# Patient Record
Sex: FEMALE | Race: WHITE | NOT HISPANIC OR LATINO | Employment: FULL TIME | URBAN - METROPOLITAN AREA
[De-identification: names, ages, dates, MRNs, and addresses within clinical notes are randomized per-mention and may not be internally consistent; named-entity substitution may affect disease eponyms.]

---

## 2017-01-05 ENCOUNTER — OFFICE VISIT (OUTPATIENT)
Dept: URGENT CARE | Facility: URGENT CARE | Age: 52
End: 2017-01-05
Payer: COMMERCIAL

## 2017-01-05 VITALS
OXYGEN SATURATION: 96 % | BODY MASS INDEX: 25.91 KG/M2 | DIASTOLIC BLOOD PRESSURE: 76 MMHG | WEIGHT: 151 LBS | HEART RATE: 74 BPM | TEMPERATURE: 99.2 F | SYSTOLIC BLOOD PRESSURE: 130 MMHG

## 2017-01-05 DIAGNOSIS — H53.8 BLURRED VISION: ICD-10-CM

## 2017-01-05 DIAGNOSIS — H57.12 LEFT EYE PAIN: Primary | ICD-10-CM

## 2017-01-05 PROCEDURE — 99213 OFFICE O/P EST LOW 20 MIN: CPT | Performed by: FAMILY MEDICINE

## 2017-01-05 RX ORDER — AZELASTINE 1 MG/ML
1 SPRAY, METERED NASAL 2 TIMES DAILY
COMMUNITY
End: 2024-08-18

## 2017-01-05 NOTE — MR AVS SNAPSHOT
After Visit Summary   1/5/2017    Anna Smith    MRN: [id number]           Patient Information     Date Of Birth          1965        Visit Information        Provider Department      1/5/2017 9:15 AM Maicol Mcnamara,  [facility] [place]        Today's Diagnoses     Left eye pain    -  1     Blurred vision            Follow-ups after your visit        Who to contact     If you have questions or need follow up information about today's clinic visit or your schedule please contact [facility] directly at [phone].  Normal or non-critical lab and imaging results will be communicated to you by GoHomehart, letter or phone within 4 business days after the clinic has received the results. If you do not hear from us within 7 days, please contact the clinic through GoHomehart or phone. If you have a critical or abnormal lab result, we will notify you by phone as soon as possible.  Submit refill requests through [employer] or call your pharmacy and they will forward the refill request to us. Please allow 3 business days for your refill to be completed.          Additional Information About Your Visit        MyChart Information     [employer] gives you secure access to your electronic health record. If you see a primary care provider, you can also send messages to your care team and make appointments. If you have questions, please call your primary care clinic.  If you do not have a primary care provider, please call [phone] and they will assist you.        Care EveryWhere ID     This is your Care EveryWhere ID. This could be used by other organizations to access your [facility] medical records  [id number]        Your Vitals Were     Pulse Temperature Pulse Oximetry             74 99.2  F (37.3  C) (Oral) 96%          Blood Pressure from Last 3 Encounters:   01/05/17 130/76   10/29/14 110/80   06/19/13 110/70    Weight from Last 3 Encounters:   01/05/17 151  lb (68.493 kg)   10/29/14 144 lb (65.318 kg)   06/19/13 131 lb (59.421 kg)              Today, you had the following     No orders found for display       Primary Care Provider    None Doctor, MD       No address on file        Thank you!     Thank you for choosing [facility]  for your care. Our goal is always to provide you with excellent care. Hearing back from our patients is one way we can continue to improve our services. Please take a few minutes to complete the written survey that you may receive in the mail after your visit with us. Thank you!             Your Updated Medication List - Protect others around you: Learn how to safely use, store and throw away your medicines at www.disposemymeds.org.          This list is accurate as of: 1/5/17  9:26 AM.  Always use your most recent med list.                   Brand Name Dispense Instructions for use    * albuterol (2.5 MG/3ML) 0.083% neb solution     1 vial    In clinic       * albuterol 108 (90 BASE) MCG/ACT Inhaler    PROAIR HFA/PROVENTIL HFA/VENTOLIN HFA    1 Inhaler    Inhale 2 puffs into the lungs every 6 hours as needed for shortness of breath / dyspnea or wheezing       azelastine 0.1 % spray    ASTELIN     Spray 1 spray into both nostrils 2 times daily       CALCIUM + D PO      Take  by mouth.       cetirizine 10 MG tablet    zyrTEC     Take 10 mg by mouth daily       fluticasone 50 MCG/ACT spray    FLONASE     Spray 2 sprays into both nostrils daily       predniSONE 20 MG tablet    DELTASONE    5 tablet    Take 1 tablet (20 mg) by mouth daily       PROGESTERONE          SUDAFED 12 HOUR 120 MG 12 hr tablet   Generic drug:  pseudoePHEDrine      Take 120 mg by mouth every 12 hours.       VITAMIN D PO          * Notice:  This list has 2 medication(s) that are the same as other medications prescribed for you. Read the directions carefully, and ask your doctor or other care provider to review them with you.

## 2017-01-05 NOTE — NURSING NOTE
"Chief Complaint   Patient presents with     Eye Problem     lt eye red and sore for 4 days,had cold sx previous to sx       Initial /76 mmHg  Pulse 74  Temp(Src) 99.2  F (37.3  C) (Oral)  Wt 151 lb (68.493 kg)  SpO2 96% Estimated body mass index is 25.91 kg/(m^2) as calculated from the following:    Height as of 2/17/06: 5' 4\" (1.626 m).    Weight as of this encounter: 151 lb (68.493 kg).  BP completed using cuff size: regular    "

## 2017-06-03 ENCOUNTER — HEALTH MAINTENANCE LETTER (OUTPATIENT)
Age: 52
End: 2017-06-03

## 2018-10-27 ENCOUNTER — OFFICE VISIT (OUTPATIENT)
Dept: URGENT CARE | Facility: URGENT CARE | Age: 53
End: 2018-10-27
Payer: COMMERCIAL

## 2018-10-27 VITALS
SYSTOLIC BLOOD PRESSURE: 128 MMHG | OXYGEN SATURATION: 94 % | TEMPERATURE: 99.6 F | HEART RATE: 82 BPM | DIASTOLIC BLOOD PRESSURE: 88 MMHG | RESPIRATION RATE: 16 BRPM

## 2018-10-27 DIAGNOSIS — R07.0 THROAT PAIN: ICD-10-CM

## 2018-10-27 DIAGNOSIS — R06.02 SOB (SHORTNESS OF BREATH): Primary | ICD-10-CM

## 2018-10-27 LAB
DEPRECATED S PYO AG THROAT QL EIA: NORMAL
SPECIMEN SOURCE: NORMAL

## 2018-10-27 PROCEDURE — 99214 OFFICE O/P EST MOD 30 MIN: CPT | Mod: 25 | Performed by: FAMILY MEDICINE

## 2018-10-27 PROCEDURE — 87880 STREP A ASSAY W/OPTIC: CPT | Performed by: FAMILY MEDICINE

## 2018-10-27 PROCEDURE — 94640 AIRWAY INHALATION TREATMENT: CPT | Performed by: FAMILY MEDICINE

## 2018-10-27 PROCEDURE — 87081 CULTURE SCREEN ONLY: CPT | Performed by: FAMILY MEDICINE

## 2018-10-27 RX ORDER — ALBUTEROL SULFATE 0.83 MG/ML
SOLUTION RESPIRATORY (INHALATION)
Qty: 1 VIAL | Refills: 0
Start: 2018-10-27 | End: 2018-10-28

## 2018-10-27 RX ORDER — ALBUTEROL SULFATE 90 UG/1
2 AEROSOL, METERED RESPIRATORY (INHALATION) EVERY 6 HOURS PRN
Qty: 1 INHALER | Refills: 0 | Status: SHIPPED | OUTPATIENT
Start: 2018-10-27 | End: 2018-11-26

## 2018-10-27 NOTE — MR AVS SNAPSHOT
After Visit Summary   10/27/2018    Anna Smith    MRN: [id number]           Patient Information     Date Of Birth          1965        Visit Information        Provider Department      10/27/2018 12:40 PM Maicol Mcnamara, DO [facility]        Today's Diagnoses     Throat pain    -  1    SOB (shortness of breath)           Follow-ups after your visit        Who to contact     If you have questions or need follow up information about today's clinic visit or your schedule please contact [facility] directly at [phone].  Normal or non-critical lab and imaging results will be communicated to you by [employer], letter or phone within 4 business days after the clinic has received the results. If you do not hear from us within 7 days, please contact the clinic through Tipprt or phone. If you have a critical or abnormal lab result, we will notify you by phone as soon as possible.  Submit refill requests through [employer] or call your pharmacy and they will forward the refill request to us. Please allow 3 business days for your refill to be completed.          Additional Information About Your Visit        MyChart Information     [employer] gives you secure access to your electronic health record. If you see a primary care provider, you can also send messages to your care team and make appointments. If you have questions, please call your primary care clinic.  If you do not have a primary care provider, please call [phone] and they will assist you.        Care EveryWhere ID     This is your Care EveryWhere ID. This could be used by other organizations to access your [place] medical records  [id number]        Your Vitals Were     Pulse Temperature Respirations Pulse Oximetry          82 99.6  F (37.6  C) (Oral) 16 94%         Blood Pressure from Last 3 Encounters:   10/27/18 128/88   01/05/17 130/76   10/29/14 110/80    Weight from Last 3  Encounters:   01/05/17 151 lb (68.5 kg)   10/29/14 144 lb (65.3 kg)   06/19/13 131 lb (59.4 kg)              We Performed the Following     Beta strep group A culture     INHALATION/NEBULIZER TREATMENT, INITIAL     Strep, Rapid Screen          Today's Medication Changes          These changes are accurate as of 10/27/18  1:56 PM.  If you have any questions, ask your nurse or doctor.               These medicines have changed or have updated prescriptions.        Dose/Directions    * albuterol (2.5 MG/3ML) 0.083% neb solution   This may have changed:  Another medication with the same name was added. Make sure you understand how and when to take each.   Used for:  Wheezing   Changed by:  Maicol Mcnamara DO        In clinic   Quantity:  1 vial   Refills:  0       * albuterol 108 (90 Base) MCG/ACT inhaler   Commonly known as:  PROAIR HFA/PROVENTIL HFA/VENTOLIN HFA   This may have changed:  Another medication with the same name was added. Make sure you understand how and when to take each.   Used for:  Wheezing   Changed by:  Maicol Mcnamara DO        Dose:  2 puff   Inhale 2 puffs into the lungs every 6 hours as needed for shortness of breath / dyspnea or wheezing   Quantity:  1 Inhaler   Refills:  0       * albuterol (2.5 MG/3ML) 0.083% neb solution   This may have changed:  You were already taking a medication with the same name, and this prescription was added. Make sure you understand how and when to take each.   Used for:  SOB (shortness of breath)   Changed by:  Maicol Mcnamara DO        1 neb in clinic   Quantity:  1 vial   Refills:  0       * albuterol 108 (90 Base) MCG/ACT inhaler   Commonly known as:  PROAIR HFA   This may have changed:  You were already taking a medication with the same name, and this prescription was added. Make sure you understand how and when to take each.   Used for:  SOB (shortness of breath)   Changed by:  Maicol Mcnamara DO        Dose:  2 puff   Inhale 2 puffs into the lungs  every 6 hours as needed   Quantity:  1 Inhaler   Refills:  0       * Notice:  This list has 4 medication(s) that are the same as other medications prescribed for you. Read the directions carefully, and ask your doctor or other care provider to review them with you.         Where to get your medicines      These medications were sent to [facility] [place], [place] - [address]  [address] [facility] [address]     Phone:  [phone]     albuterol 108 (90 Base) MCG/ACT inhaler         Some of these will need a paper prescription and others can be bought over the counter.  Ask your nurse if you have questions.     You don't need a prescription for these medications     albuterol (2.5 MG/3ML) 0.083% neb solution                Primary Care Provider Office Phone # Fax #    Joseline NUÑEZ Jennieaddison [phone]       [facility] [address]  [facility] [address]        Equal Access to Services     HEMANTH DURANT AH: Hadii aad ku hadasho Soomaali, waaxda luqadaha, qaybta kaalmada adeegyada, waxay idiin haymillern shravan shah . So [facility] [phone].    ATENCIÓN: Si habla español, tiene a owens disposición servicios gratuitos de asistencia lingüística. [facility] [phone].    We comply with applicable federal civil rights laws and [place] laws. We do not discriminate on the basis of race, color, national origin, age, disability, sex, sexual orientation, or gender identity.            Thank you!     Thank you for choosing [facility]  for your care. Our goal is always to provide you with excellent care. Hearing back from our patients is one way we can continue to improve our services. Please take a few minutes to complete the written survey that you may receive in the mail after your visit with us. Thank you!             Your Updated Medication List - Protect others around you: Learn how to safely use, store and throw away your medicines at  www.disposemymeds.org.          This list is accurate as of 10/27/18  1:56 PM.  Always use your most recent med list.                   Brand Name Dispense Instructions for use Diagnosis    * albuterol (2.5 MG/3ML) 0.083% neb solution     1 vial    In clinic    Wheezing       * albuterol 108 (90 Base) MCG/ACT inhaler    PROAIR HFA/PROVENTIL HFA/VENTOLIN HFA    1 Inhaler    Inhale 2 puffs into the lungs every 6 hours as needed for shortness of breath / dyspnea or wheezing    Wheezing       * albuterol (2.5 MG/3ML) 0.083% neb solution     1 vial    1 neb in clinic    SOB (shortness of breath)       * albuterol 108 (90 Base) MCG/ACT inhaler    PROAIR HFA    1 Inhaler    Inhale 2 puffs into the lungs every 6 hours as needed    SOB (shortness of breath)       azelastine 0.1 % nasal spray    ASTELIN     Spray 1 spray into both nostrils 2 times daily        CALCIUM + D PO      Take  by mouth.        cetirizine 10 MG tablet    zyrTEC     Take 10 mg by mouth daily        fluticasone 50 MCG/ACT spray    FLONASE     Spray 2 sprays into both nostrils daily        predniSONE 20 MG tablet    DELTASONE    5 tablet    Take 1 tablet (20 mg) by mouth daily    Wheezing       PROGESTERONE           SUDAFED 12 HOUR 120 MG 12 hr tablet   Generic drug:  pseudoePHEDrine      Take 120 mg by mouth every 12 hours.        VITAMIN D PO           * Notice:  This list has 4 medication(s) that are the same as other medications prescribed for you. Read the directions carefully, and ask your doctor or other care provider to review them with you.

## 2018-10-28 LAB
BACTERIA SPEC CULT: NORMAL
SPECIMEN SOURCE: NORMAL

## 2019-09-29 ENCOUNTER — HEALTH MAINTENANCE LETTER (OUTPATIENT)
Age: 54
End: 2019-09-29

## 2021-01-14 ENCOUNTER — HEALTH MAINTENANCE LETTER (OUTPATIENT)
Age: 56
End: 2021-01-14

## 2021-10-23 ENCOUNTER — HEALTH MAINTENANCE LETTER (OUTPATIENT)
Age: 56
End: 2021-10-23

## 2022-02-12 ENCOUNTER — HEALTH MAINTENANCE LETTER (OUTPATIENT)
Age: 57
End: 2022-02-12

## 2022-10-10 ENCOUNTER — HEALTH MAINTENANCE LETTER (OUTPATIENT)
Age: 57
End: 2022-10-10

## 2022-11-12 ENCOUNTER — E-VISIT (OUTPATIENT)
Dept: URGENT CARE | Facility: CLINIC | Age: 57
End: 2022-11-12
Payer: COMMERCIAL

## 2022-11-12 DIAGNOSIS — J02.9 SORE THROAT: ICD-10-CM

## 2022-11-12 DIAGNOSIS — Z20.822 SUSPECTED COVID-19 VIRUS INFECTION: ICD-10-CM

## 2022-11-12 PROCEDURE — 99421 OL DIG E/M SVC 5-10 MIN: CPT | Mod: CS | Performed by: INTERNAL MEDICINE

## 2022-11-12 NOTE — PATIENT INSTRUCTIONS
Anna,    Your symptoms show that you may have influenza, strep or coronavirus (COVID-19).  You could also have another virus.    Because of the information you provided about your symptoms, I would like to also test you for strep, covid and influenza.  We do not test for RSV in adults.    What should I do?  I have placed orders for these tests.   To schedule: go to your [employer] home page and scroll down to the section that says  You have an appointment that needs to be scheduled  and click the large green button that says  Schedule Now  and follow the steps to find the next available openings.     If you are unable to complete these [employer] scheduling steps, please call [phone] to schedule your testing.     These guidelines are for isolating before returning to work, school or .     For employers, schools and day cares: This is an official notice for this person s medical guidelines for returning in-person.     For health care sites: The [facility] gives different isolation and quarantine guidelines for healthcare sites, please check with these sites before arriving.     How do I self-isolate?  You isolate when you have symptoms of COVID or a test shows you have COVID, even if you don t have symptoms.     If you DO have symptoms:  o Stay home and away from others  - For at least 5 days after your symptoms started, AND   - You are fever free for 24 hours (with no medicine that reduces fever), AND  - Your other symptoms are better.  o Wear a mask for 10 full days any time you are around others.    If you DON T have symptoms:  o Stay at home and away from others for at least 5 days after your positive test.  o Wear a mask for 10 full days any time you are around others.    How can I take care of myself?  Over the counter medications may help with your symptoms such as runny or stuffy nose, cough, chills, or fever. Talk to your care team about your options.     Some people are at high risk of severe illness (for  example, you have a weak immune system, you re 65 years or older, or you have certain medical problems). If your risk is high and your symptoms started in the last 5 to 7 days, we strongly recommend for you to get COVID treatment as soon as possible. Paxlovid, Molnupiravir and the monoclonal antibody treatments are proven safe and effective, make you feel better faster, and prevent hospitalization and death.       To schedule an appointment to discuss COVID treatment, request an appointment on [employer] (select  COVID-19 Treatment ) or call [employer] ([phone]), press 7.      Get lots of rest. Drink extra fluids (unless a doctor has told you not to)    Take Tylenol (acetaminophen) or ibuprofen for fever or pain. If you have liver or kidney problems, ask your family doctor if it's okay to take Tylenol or ibuprofen    Take over the counter medications for your symptoms, as directed by your doctor. You may also talk to your pharmacist.      If you have other health problems (like cancer, heart failure, an organ transplant or severe kidney disease): Call your specialty clinic if you don't feel better in the next 2 days.    Know when to call 911. Emergency warning signs include:  o Trouble breathing or shortness of breath  o Pain or pressure in the chest that doesn't go away  o Feeling confused like you haven't felt before, or not being able to wake up  o Bluish-colored lips or face    Where can I get more information?     [employer] [place] - About COVID-19: [email or web address]     CDC - What to Do If You're Sick: https://www.cdc.gov/coronavirus/2019-ncov/if-you-are-sick/index.html     CDC - Quarantine & Isolation: https://www.cdc.gov/coronavirus/2019-ncov/your-health/quarantine-isolation.html     [facility] clinical trials (COVID-19 research studies): clinicalaffairs.[facility]/n-clinical-trials    Below are the COVID-19 hotlines at the [place] Department of Health ([facility]). Interpreters  are available.  o For health questions: Call 378-604-9135 or 1-435.671.3031 (7 a.m. to 7 p.m.)  o For questions about schools and childcare: Call 658-655-5849 or 1-651.611.4567 (7 a.m. to 7 p.m.)  November 12, 2022  RE:  Anna Smith                                                                                                                  [address]  [facility] [address]      To whom it may concern:    I evaluated Anna Smith on November 12, 2022. Anna Smith should be excused from work/school.     They should let their workplace manager and staffing office know when their quarantine ends.    We can not give an exact date as it depends on the information below. They can calculate this on their own or work with their manager/staffing office to calculate this. (For example if they were exposed on 10/04, they would have to quarantine for 14 full days. That would be through 10/18. They could return on 10/19.)    Quarantine Guidelines:    If patient receives a positive COVID-19 test result, they should follow the guidance of those who are giving the results. Usually the return to work is 10 (or in some cases 20 days from symptom onset.) If they work at [employer], they must be cleared by Employee Occupational Health and Safety to return to work.      If patient receives a negative COVID-19 test result and did not have a high risk exposure to someone with a known positive COVID-19 test, they can return to work once they're free of fever for 24 hours without fever-reducing medication and their symptoms are improving or resolved.    If patient receives a negative COVID-19 test and if they had a high risk exposure to someone who has tested positive for COVID-19 then they can return to work 14 days after their last contact with the positive individual    Note: If there is ongoing exposure to the covid positive person, this quarantine period may be longer than 14 days. (For example, if they are  continually exposed to their child, who tested positive and cannot isolate from them, then the quarantine of 7-14 days can't start until their child is no longer contagious. This is typically 10 days from onset to the child's symptoms. So the total duration may be 17-24 days in this case.)     Sincerely,  Demi Pelletier MD          o

## 2022-11-14 ENCOUNTER — LAB (OUTPATIENT)
Dept: URGENT CARE | Facility: URGENT CARE | Age: 57
End: 2022-11-14
Attending: INTERNAL MEDICINE
Payer: COMMERCIAL

## 2022-11-14 ENCOUNTER — TELEPHONE (OUTPATIENT)
Dept: FAMILY MEDICINE | Facility: CLINIC | Age: 57
End: 2022-11-14

## 2022-11-14 DIAGNOSIS — Z20.822 SUSPECTED COVID-19 VIRUS INFECTION: ICD-10-CM

## 2022-11-14 DIAGNOSIS — J02.9 SORE THROAT: ICD-10-CM

## 2022-11-14 LAB
DEPRECATED S PYO AG THROAT QL EIA: NEGATIVE
FLUAV AG SPEC QL IA: POSITIVE
FLUBV AG SPEC QL IA: NEGATIVE
GROUP A STREP BY PCR: NOT DETECTED
SARS-COV-2 RNA RESP QL NAA+PROBE: NEGATIVE

## 2022-11-14 PROCEDURE — 87651 STREP A DNA AMP PROBE: CPT

## 2022-11-14 PROCEDURE — U0005 INFEC AGEN DETEC AMPLI PROBE: HCPCS

## 2022-11-14 PROCEDURE — 87804 INFLUENZA ASSAY W/OPTIC: CPT

## 2022-11-14 PROCEDURE — U0003 INFECTIOUS AGENT DETECTION BY NUCLEIC ACID (DNA OR RNA); SEVERE ACUTE RESPIRATORY SYNDROME CORONAVIRUS 2 (SARS-COV-2) (CORONAVIRUS DISEASE [COVID-19]), AMPLIFIED PROBE TECHNIQUE, MAKING USE OF HIGH THROUGHPUT TECHNOLOGIES AS DESCRIBED BY CMS-2020-01-R: HCPCS

## 2022-11-14 NOTE — TELEPHONE ENCOUNTER
Connected with Pt and informed that she will need to obtain gap period Rx from old provider's office. Pt verbalized understanding.

## 2022-11-14 NOTE — TELEPHONE ENCOUNTER
Reason for call:  Other   Patient called regarding (reason for call): appointment    Additional comments:   Appt request to establish and medication check in.  She has an Annual scheduled in Feb. She has roughly 2 mos left of her medications.  Please call to discuss. thank you    Phone number to reach patient:  Cell number on file:    Telephone Information:   Mobile [phone]       Best Time:  any    Can we leave a detailed message on this number?  YES    Travel screening: Not Applicable

## 2023-02-13 ENCOUNTER — OFFICE VISIT (OUTPATIENT)
Dept: FAMILY MEDICINE | Facility: CLINIC | Age: 58
End: 2023-02-13
Payer: COMMERCIAL

## 2023-02-13 VITALS
BODY MASS INDEX: 23.95 KG/M2 | TEMPERATURE: 98.5 F | RESPIRATION RATE: 16 BRPM | WEIGHT: 140.3 LBS | HEART RATE: 70 BPM | DIASTOLIC BLOOD PRESSURE: 77 MMHG | SYSTOLIC BLOOD PRESSURE: 115 MMHG | HEIGHT: 64 IN | OXYGEN SATURATION: 97 %

## 2023-02-13 DIAGNOSIS — E78.5 HYPERLIPIDEMIA LDL GOAL <100: ICD-10-CM

## 2023-02-13 DIAGNOSIS — I10 PRIMARY HYPERTENSION: Primary | ICD-10-CM

## 2023-02-13 DIAGNOSIS — G47.33 OBSTRUCTIVE SLEEP APNEA SYNDROME: ICD-10-CM

## 2023-02-13 DIAGNOSIS — J31.0 CHRONIC RHINITIS: ICD-10-CM

## 2023-02-13 LAB
CHOLEST SERPL-MCNC: 180 MG/DL
HDLC SERPL-MCNC: 40 MG/DL
LDLC SERPL CALC-MCNC: 121 MG/DL
NONHDLC SERPL-MCNC: 140 MG/DL
TRIGL SERPL-MCNC: 94 MG/DL

## 2023-02-13 PROCEDURE — 36415 COLL VENOUS BLD VENIPUNCTURE: CPT | Performed by: INTERNAL MEDICINE

## 2023-02-13 PROCEDURE — 99204 OFFICE O/P NEW MOD 45 MIN: CPT | Performed by: INTERNAL MEDICINE

## 2023-02-13 PROCEDURE — 80061 LIPID PANEL: CPT | Performed by: INTERNAL MEDICINE

## 2023-02-13 RX ORDER — CHLORTHALIDONE 25 MG/1
0.5 TABLET ORAL DAILY
COMMUNITY
Start: 2023-01-11 | End: 2023-06-14

## 2023-02-13 RX ORDER — PROGESTERONE 100 MG/1
CAPSULE ORAL
COMMUNITY
Start: 2022-12-16

## 2023-02-13 ASSESSMENT — PAIN SCALES - GENERAL: PAINLEVEL: NO PAIN (0)

## 2023-02-13 NOTE — PROGRESS NOTES
"  Assessment & Plan     Primary hypertension  Stable.  Continue medication    Chronic rhinitis  Stable.  Continue over-the-counter medications    Obstructive sleep apnea syndrome  Stable.    Hyperlipidemia LDL goal <100  - Lipid Profile     See Patient Instructions    Return in about 6 months (around 8/13/2023) for Follow up, Routine preventive, with me.    JACKY BOYD MD  [facility] YANIRA Castillo is a 57 year old, presenting for the following health issues:  Establish Care    Anna is a very pleasant 57-year-old lady who presented to the clinic to establish care.  She has primary hypertension and takes chlorthalidone 25 mg daily.  She has chronic rhinitis and uses over-the-counter Zyrtec and Flonase as needed.  She has sleep apnea and has a mandibular device.  She is postmenopausal and takes progesterone 100 mg 1 to 3 capsules daily.  Family history of DVT in father and hypertension in mother.  She is a .  Colonoscopy from [facility] - every 5 years  Mammogram - up to date  Pap- 2019 now due in 2024     History of Present Illness       Reason for visit:  Annual physical    She eats 2-3 servings of fruits and vegetables daily.She consumes 0 sweetened beverage(s) daily.She exercises with enough effort to increase her heart rate 20 to 29 minutes per day.  She exercises with enough effort to increase her heart rate 3 or less days per week.   She is taking medications regularly.       Review of Systems       Objective    /77 (BP Location: Right arm, Patient Position: Sitting, Cuff Size: Adult Regular)   Pulse 70   Temp 98.5  F (36.9  C)   Resp 16   Ht 1.626 m (5' 4\")   Wt 63.6 kg (140 lb 4.8 oz)   SpO2 97%   BMI 24.08 kg/m    Body mass index is 24.08 kg/m .  Physical Exam               "

## 2023-03-25 ENCOUNTER — HEALTH MAINTENANCE LETTER (OUTPATIENT)
Age: 58
End: 2023-03-25

## 2023-06-13 ENCOUNTER — MYC MEDICAL ADVICE (OUTPATIENT)
Dept: FAMILY MEDICINE | Facility: CLINIC | Age: 58
End: 2023-06-13
Payer: COMMERCIAL

## 2023-06-13 DIAGNOSIS — H10.9 CONJUNCTIVITIS, UNSPECIFIED CONJUNCTIVITIS TYPE, UNSPECIFIED LATERALITY: Primary | ICD-10-CM

## 2023-06-13 RX ORDER — POLYMYXIN B SULFATE AND TRIMETHOPRIM 1; 10000 MG/ML; [USP'U]/ML
1-2 SOLUTION OPHTHALMIC EVERY 4 HOURS
Qty: 5 ML | Refills: 0 | Status: SHIPPED | OUTPATIENT
Start: 2023-06-13 | End: 2023-06-20

## 2023-06-13 NOTE — TELEPHONE ENCOUNTER
See MyChart from patient needing provider review.   Please write back directly to pt or advise if clinic follow up needed.     Dayana WELDON, RN  [facility] RN Triage Team

## 2023-06-14 ENCOUNTER — OFFICE VISIT (OUTPATIENT)
Dept: FAMILY MEDICINE | Facility: CLINIC | Age: 58
End: 2023-06-14
Payer: COMMERCIAL

## 2023-06-14 VITALS
TEMPERATURE: 98.5 F | SYSTOLIC BLOOD PRESSURE: 109 MMHG | BODY MASS INDEX: 23.39 KG/M2 | OXYGEN SATURATION: 99 % | HEART RATE: 66 BPM | WEIGHT: 137 LBS | RESPIRATION RATE: 18 BRPM | DIASTOLIC BLOOD PRESSURE: 74 MMHG | HEIGHT: 64 IN

## 2023-06-14 DIAGNOSIS — I49.9 CARDIAC ARRHYTHMIA, UNSPECIFIED CARDIAC ARRHYTHMIA TYPE: ICD-10-CM

## 2023-06-14 DIAGNOSIS — Z00.00 ANNUAL PHYSICAL EXAM: Primary | ICD-10-CM

## 2023-06-14 DIAGNOSIS — Z11.4 SCREENING FOR HIV (HUMAN IMMUNODEFICIENCY VIRUS): ICD-10-CM

## 2023-06-14 DIAGNOSIS — I10 PRIMARY HYPERTENSION: ICD-10-CM

## 2023-06-14 DIAGNOSIS — H10.9 CONJUNCTIVITIS OF BOTH EYES, UNSPECIFIED CONJUNCTIVITIS TYPE: ICD-10-CM

## 2023-06-14 DIAGNOSIS — R06.2 WHEEZING: ICD-10-CM

## 2023-06-14 LAB
ANION GAP SERPL CALCULATED.3IONS-SCNC: 16 MMOL/L (ref 7–15)
BUN SERPL-MCNC: 10.4 MG/DL (ref 8–23)
CALCIUM SERPL-MCNC: 9.6 MG/DL (ref 8.8–10.2)
CHLORIDE SERPL-SCNC: 100 MMOL/L (ref 98–107)
CREAT SERPL-MCNC: 0.7 MG/DL (ref 0.51–0.95)
DEPRECATED HCO3 PLAS-SCNC: 26 MMOL/L (ref 22–29)
ERYTHROCYTE [DISTWIDTH] IN BLOOD BY AUTOMATED COUNT: 12.3 % (ref 10–15)
GFR SERPL CREATININE-BSD FRML MDRD: >90 ML/MIN/1.73M2
GLUCOSE SERPL-MCNC: 94 MG/DL (ref 70–99)
HCT VFR BLD AUTO: 39 % (ref 35–47)
HGB BLD-MCNC: 13.4 G/DL (ref 11.7–15.7)
MCH RBC QN AUTO: 31.4 PG (ref 26.5–33)
MCHC RBC AUTO-ENTMCNC: 34.4 G/DL (ref 31.5–36.5)
MCV RBC AUTO: 91 FL (ref 78–100)
PLATELET # BLD AUTO: 199 10E3/UL (ref 150–450)
POTASSIUM SERPL-SCNC: 3.6 MMOL/L (ref 3.4–5.3)
RBC # BLD AUTO: 4.27 10E6/UL (ref 3.8–5.2)
SODIUM SERPL-SCNC: 142 MMOL/L (ref 136–145)
TSH SERPL DL<=0.005 MIU/L-ACNC: 0.53 UIU/ML (ref 0.3–4.2)
WBC # BLD AUTO: 10 10E3/UL (ref 4–11)

## 2023-06-14 PROCEDURE — 80048 BASIC METABOLIC PNL TOTAL CA: CPT | Performed by: INTERNAL MEDICINE

## 2023-06-14 PROCEDURE — 84443 ASSAY THYROID STIM HORMONE: CPT | Performed by: INTERNAL MEDICINE

## 2023-06-14 PROCEDURE — 99214 OFFICE O/P EST MOD 30 MIN: CPT | Mod: 25 | Performed by: INTERNAL MEDICINE

## 2023-06-14 PROCEDURE — 93000 ELECTROCARDIOGRAM COMPLETE: CPT | Performed by: INTERNAL MEDICINE

## 2023-06-14 PROCEDURE — 85027 COMPLETE CBC AUTOMATED: CPT | Performed by: INTERNAL MEDICINE

## 2023-06-14 PROCEDURE — 36415 COLL VENOUS BLD VENIPUNCTURE: CPT | Performed by: INTERNAL MEDICINE

## 2023-06-14 PROCEDURE — 99396 PREV VISIT EST AGE 40-64: CPT | Performed by: INTERNAL MEDICINE

## 2023-06-14 RX ORDER — ALBUTEROL SULFATE 90 UG/1
2 AEROSOL, METERED RESPIRATORY (INHALATION) EVERY 6 HOURS PRN
Qty: 1 G | Refills: 3 | Status: SHIPPED | OUTPATIENT
Start: 2023-06-14 | End: 2024-06-25

## 2023-06-14 RX ORDER — CHLORTHALIDONE 25 MG/1
12.5 TABLET ORAL DAILY
Qty: 90 TABLET | Refills: 1 | Status: SHIPPED | OUTPATIENT
Start: 2023-06-14 | End: 2024-06-25

## 2023-06-14 ASSESSMENT — ENCOUNTER SYMPTOMS
CONSTIPATION: 0
PALPITATIONS: 0
PARESTHESIAS: 0
DYSURIA: 0
COUGH: 0
SHORTNESS OF BREATH: 0
FEVER: 0
ARTHRALGIAS: 0
BREAST MASS: 0
EYE PAIN: 0
HEMATOCHEZIA: 0
SORE THROAT: 0
HEADACHES: 0
FREQUENCY: 0
DIZZINESS: 0
DIARRHEA: 0
NAUSEA: 0
JOINT SWELLING: 0
ABDOMINAL PAIN: 0
NERVOUS/ANXIOUS: 0
HEMATURIA: 0
WEAKNESS: 0
MYALGIAS: 0
HEARTBURN: 0
CHILLS: 0

## 2023-06-14 ASSESSMENT — PAIN SCALES - GENERAL: PAINLEVEL: NO PAIN (0)

## 2023-06-14 NOTE — PROGRESS NOTES
SUBJECTIVE:   CC: Anna is an 58 year old who presents for preventive health visit.   Healthy Habits:    Getting at least 3 servings of Calcium per day:  Yes    Bi-annual eye exam:  NO    Dental care twice a year:  Yes    Sleep apnea or symptoms of sleep apnea:  Sleep apnea    Diet:  Regular (no restrictions)    Frequency of exercise:  2-3 days/week    Duration of exercise:  15-30 minutes    Taking medications regularly:  Yes    Medication side effects:  None    PHQ-2 Total Score:    Additional concerns today:  No    Anna is a very pleasant 58 year old lady who presented to the clinic for APE  She has no concerns today.  On chest auscultation, abnormal heart rhythm. EKG in clinic shows PVC. She does not have any symptoms.  Coronary calcium in 2019 was 0.      Have you ever done Advance Care Planning? (For example, a Health Directive, POLST, or a discussion with a medical provider or your loved ones about your wishes): No, advance care planning information given to patient to review.  Patient plans to discuss their wishes with loved ones or provider.      Social History     Tobacco Use     Smoking status: Never     Smokeless tobacco: Never   Vaping Use     Vaping status: Never Used   Substance Use Topics     Alcohol use: Yes     Comment: 2 drinks per week           2023    10:06 AM   Alcohol Use   Prescreen: >3 drinks/day or >7 drinks/week? No     Reviewed orders with patient.  Reviewed health maintenance and updated orders accordingly - Yes  Lab work is in process    Breast Cancer Screenin/14/2023    10:08 AM   Breast CA Risk Assessment (FHS-7)   Do you have a family history of breast, colon, or ovarian cancer? No / Unknown     Mammogram Screening: Recommended mammography every 1-2 years with patient discussion and risk factor consideration  Pertinent mammograms are reviewed under the imaging tab.    History of abnormal Pap smear: NO - age 30-65 PAP every 5 years with negative HPV co-testing  "recommended     Reviewed and updated as needed this visit by clinical staff   Tobacco  Allergies             Review of Systems   Constitutional: Negative for chills and fever.   HENT: Positive for congestion. Negative for ear pain, hearing loss and sore throat.    Eyes: Negative for pain and visual disturbance.   Respiratory: Negative for cough and shortness of breath.    Cardiovascular: Negative for chest pain, palpitations and peripheral edema.   Gastrointestinal: Negative for abdominal pain, constipation, diarrhea, heartburn, hematochezia and nausea.   Breasts:  Negative for tenderness, breast mass and discharge.   Genitourinary: Negative for dysuria, frequency, genital sores, hematuria, pelvic pain, urgency, vaginal bleeding and vaginal discharge.   Musculoskeletal: Negative for arthralgias, joint swelling and myalgias.   Skin: Negative for rash.   Neurological: Negative for dizziness, weakness, headaches and paresthesias.   Psychiatric/Behavioral: Negative for mood changes. The patient is not nervous/anxious.         OBJECTIVE:   /74 (BP Location: Left arm, Patient Position: Sitting, Cuff Size: Adult Regular)   Pulse 66   Temp 98.5  F (36.9  C) (Oral)   Resp 18   Ht 1.626 m (5' 4\")   Wt 62.1 kg (137 lb)   SpO2 99%   BMI 23.52 kg/m    Physical Exam  Vitals reviewed.   Constitutional:       Appearance: Normal appearance.   HENT:      Right Ear: Tympanic membrane normal. There is no impacted cerumen.      Left Ear: Tympanic membrane normal. There is no impacted cerumen.      Mouth/Throat:      Mouth: Mucous membranes are moist.      Pharynx: Oropharynx is clear. No oropharyngeal exudate or posterior oropharyngeal erythema.   Cardiovascular:      Rate and Rhythm: Normal rate and regular rhythm.      Heart sounds: Normal heart sounds. No murmur heard.     No gallop.   Pulmonary:      Effort: Pulmonary effort is normal. No respiratory distress.      Breath sounds: Normal breath sounds. No stridor. No " wheezing, rhonchi or rales.   Chest:   Breasts:     Right: No swelling, bleeding, inverted nipple, mass, nipple discharge, skin change or tenderness.      Left: No swelling, bleeding, inverted nipple, mass, nipple discharge, skin change or tenderness.   Abdominal:      General: Abdomen is flat. There is no distension.      Palpations: Abdomen is soft. There is no mass.      Tenderness: There is no abdominal tenderness. There is no guarding.      Hernia: No hernia is present.   Musculoskeletal:         General: Normal range of motion.      Cervical back: Normal range of motion and neck supple. No rigidity or tenderness.      Right lower leg: No edema.      Left lower leg: No edema.   Lymphadenopathy:      Cervical: No cervical adenopathy.   Skin:     General: Skin is warm and dry.   Neurological:      General: No focal deficit present.      Mental Status: She is alert.   Psychiatric:         Mood and Affect: Mood normal.       ASSESSMENT/PLAN:   Anna was seen today for physical.    Diagnoses and all orders for this visit:    Annual physical exam  -     CBC with Platelets (Today); Future  -     Basic metabolic panel; Future  -     TSH with free T4 reflex; Future    Screening for HIV (human immunodeficiency virus)  Declined.    Conjunctivitis of both eyes, unspecified conjunctivitis type  Stable. Improving.    Wheezing  Use as needed. Not formally diagnosed with asthma  -     albuterol (PROAIR HFA/PROVENTIL HFA/VENTOLIN HFA) 108 (90 Base) MCG/ACT inhaler; Inhale 2 puffs into the lungs every 6 hours as needed for shortness of breath or wheezing    Primary hypertension  Stable. Continue meds  -     chlorthalidone (HYGROTON) 25 MG tablet; Take 0.5 tablets (12.5 mg) by mouth daily    Cardiac arrhythmia, unspecified cardiac arrhythmia type  PVC's.   Discussed causes.  If she develops symptoms, will do heart monitor.  -     EKG 12-lead complete w/read - Clinics        Patient has been advised of split billing requirements  and indicates understanding: Yes      COUNSELING:  Reviewed preventive health counseling, as reflected in patient instructions       HIV screeninx in teen years, 1x in adult years, and at intervals if high risk        She reports that she has never smoked. She has never used smokeless tobacco.    JACKY BOYD MD  [facility]

## 2023-06-14 NOTE — PATIENT INSTRUCTIONS
There is this is a new shingles vaccine available called shingrex  It is a series of 2 shots 2-6 months apart.  Considered more than 90% effective.  Please go to any pharmacy to get the  vaccine      Please send me message about your colonoscopy - when and where it was done.

## 2023-07-24 ENCOUNTER — TELEPHONE (OUTPATIENT)
Dept: FAMILY MEDICINE | Facility: CLINIC | Age: 58
End: 2023-07-24

## 2023-07-24 NOTE — TELEPHONE ENCOUNTER
Patient Quality Outreach    Patient is due for the following:   Breast Cancer Screening - Mammogram    Next Steps:   No follow up needed at this time.    Type of outreach:    Sent [email or web address] message.      Questions for provider review:    None           Melita Hagan CMA

## 2023-08-16 ENCOUNTER — TRANSFERRED RECORDS (OUTPATIENT)
Dept: HEALTH INFORMATION MANAGEMENT | Facility: CLINIC | Age: 58
End: 2023-08-16
Payer: COMMERCIAL

## 2023-08-30 ENCOUNTER — TRANSFERRED RECORDS (OUTPATIENT)
Dept: FAMILY MEDICINE | Facility: CLINIC | Age: 58
End: 2023-08-30
Payer: COMMERCIAL

## 2024-02-08 ENCOUNTER — TELEPHONE (OUTPATIENT)
Dept: FAMILY MEDICINE | Facility: CLINIC | Age: 59
End: 2024-02-08
Payer: COMMERCIAL

## 2024-02-08 NOTE — TELEPHONE ENCOUNTER
Patient Returning Call    Reason for call:  Patient called to request annual visit appt after 06/14/24, was not able to book any time. Please check and advise    Information relayed to patient:  24/48 hrs for a call back    Patient has additional questions:  No      Could we send this information to you in [facility] or would you prefer to receive a phone call?:   Patient would prefer a phone call   Okay to leave a detailed message?: Yes at Home number on file [phone] (home)

## 2024-06-24 SDOH — HEALTH STABILITY: PHYSICAL HEALTH: ON AVERAGE, HOW MANY MINUTES DO YOU ENGAGE IN EXERCISE AT THIS LEVEL?: 40 MIN

## 2024-06-24 SDOH — HEALTH STABILITY: PHYSICAL HEALTH: ON AVERAGE, HOW MANY DAYS PER WEEK DO YOU ENGAGE IN MODERATE TO STRENUOUS EXERCISE (LIKE A BRISK WALK)?: 7 DAYS

## 2024-06-24 ASSESSMENT — SOCIAL DETERMINANTS OF HEALTH (SDOH): HOW OFTEN DO YOU GET TOGETHER WITH FRIENDS OR RELATIVES?: THREE TIMES A WEEK

## 2024-06-25 ENCOUNTER — OFFICE VISIT (OUTPATIENT)
Dept: FAMILY MEDICINE | Facility: CLINIC | Age: 59
End: 2024-06-25
Payer: COMMERCIAL

## 2024-06-25 VITALS
SYSTOLIC BLOOD PRESSURE: 122 MMHG | OXYGEN SATURATION: 99 % | RESPIRATION RATE: 16 BRPM | TEMPERATURE: 97.4 F | DIASTOLIC BLOOD PRESSURE: 80 MMHG | HEART RATE: 66 BPM

## 2024-06-25 DIAGNOSIS — I10 PRIMARY HYPERTENSION: ICD-10-CM

## 2024-06-25 DIAGNOSIS — D36.9 TUBULAR ADENOMA: ICD-10-CM

## 2024-06-25 DIAGNOSIS — R06.2 WHEEZING: ICD-10-CM

## 2024-06-25 DIAGNOSIS — Z13.29 SCREENING FOR THYROID DISORDER: ICD-10-CM

## 2024-06-25 DIAGNOSIS — E78.5 HYPERLIPIDEMIA, UNSPECIFIED HYPERLIPIDEMIA TYPE: ICD-10-CM

## 2024-06-25 DIAGNOSIS — Z12.4 CERVICAL CANCER SCREENING: ICD-10-CM

## 2024-06-25 DIAGNOSIS — Z79.899 MEDICATION MANAGEMENT: ICD-10-CM

## 2024-06-25 DIAGNOSIS — Z00.00 ROUTINE GENERAL MEDICAL EXAMINATION AT A HEALTH CARE FACILITY: Primary | ICD-10-CM

## 2024-06-25 DIAGNOSIS — Z11.4 SCREENING FOR HIV (HUMAN IMMUNODEFICIENCY VIRUS): ICD-10-CM

## 2024-06-25 DIAGNOSIS — E87.6 HYPOKALEMIA: ICD-10-CM

## 2024-06-25 DIAGNOSIS — Z13.0 SCREENING FOR DEFICIENCY ANEMIA: ICD-10-CM

## 2024-06-25 DIAGNOSIS — Z23 NEED FOR VACCINATION: ICD-10-CM

## 2024-06-25 DIAGNOSIS — Z12.11 SCREEN FOR COLON CANCER: ICD-10-CM

## 2024-06-25 LAB
ALBUMIN SERPL BCG-MCNC: 4.6 G/DL (ref 3.5–5.2)
ALP SERPL-CCNC: 81 U/L (ref 40–150)
ALT SERPL W P-5'-P-CCNC: 22 U/L (ref 0–50)
ANION GAP SERPL CALCULATED.3IONS-SCNC: 11 MMOL/L (ref 7–15)
AST SERPL W P-5'-P-CCNC: 19 U/L (ref 0–45)
BILIRUB SERPL-MCNC: 0.5 MG/DL
BUN SERPL-MCNC: 11.6 MG/DL (ref 8–23)
CALCIUM SERPL-MCNC: 9.7 MG/DL (ref 8.8–10.2)
CHLORIDE SERPL-SCNC: 101 MMOL/L (ref 98–107)
CHOLEST SERPL-MCNC: 237 MG/DL
CREAT SERPL-MCNC: 0.72 MG/DL (ref 0.51–0.95)
DEPRECATED HCO3 PLAS-SCNC: 29 MMOL/L (ref 22–29)
EGFRCR SERPLBLD CKD-EPI 2021: >90 ML/MIN/1.73M2
ERYTHROCYTE [DISTWIDTH] IN BLOOD BY AUTOMATED COUNT: 11.9 % (ref 10–15)
FASTING STATUS PATIENT QL REPORTED: YES
FASTING STATUS PATIENT QL REPORTED: YES
GLUCOSE SERPL-MCNC: 92 MG/DL (ref 70–99)
HCT VFR BLD AUTO: 42 % (ref 35–47)
HDLC SERPL-MCNC: 53 MG/DL
HGB BLD-MCNC: 14.3 G/DL (ref 11.7–15.7)
LDLC SERPL CALC-MCNC: 158 MG/DL
MCH RBC QN AUTO: 31.2 PG (ref 26.5–33)
MCHC RBC AUTO-ENTMCNC: 34 G/DL (ref 31.5–36.5)
MCV RBC AUTO: 92 FL (ref 78–100)
NONHDLC SERPL-MCNC: 184 MG/DL
PLATELET # BLD AUTO: 198 10E3/UL (ref 150–450)
POTASSIUM SERPL-SCNC: 3.2 MMOL/L (ref 3.4–5.3)
PROT SERPL-MCNC: 7.4 G/DL (ref 6.4–8.3)
RBC # BLD AUTO: 4.58 10E6/UL (ref 3.8–5.2)
SODIUM SERPL-SCNC: 141 MMOL/L (ref 135–145)
TRIGL SERPL-MCNC: 130 MG/DL
TSH SERPL DL<=0.005 MIU/L-ACNC: 0.97 UIU/ML (ref 0.3–4.2)
WBC # BLD AUTO: 5.7 10E3/UL (ref 4–11)

## 2024-06-25 PROCEDURE — 80053 COMPREHEN METABOLIC PANEL: CPT | Performed by: INTERNAL MEDICINE

## 2024-06-25 PROCEDURE — 36415 COLL VENOUS BLD VENIPUNCTURE: CPT | Performed by: INTERNAL MEDICINE

## 2024-06-25 PROCEDURE — 99214 OFFICE O/P EST MOD 30 MIN: CPT | Mod: 25 | Performed by: INTERNAL MEDICINE

## 2024-06-25 PROCEDURE — G0145 SCR C/V CYTO,THINLAYER,RESCR: HCPCS | Performed by: INTERNAL MEDICINE

## 2024-06-25 PROCEDURE — 99396 PREV VISIT EST AGE 40-64: CPT | Mod: 25 | Performed by: INTERNAL MEDICINE

## 2024-06-25 PROCEDURE — 84443 ASSAY THYROID STIM HORMONE: CPT | Performed by: INTERNAL MEDICINE

## 2024-06-25 PROCEDURE — 90471 IMMUNIZATION ADMIN: CPT | Performed by: INTERNAL MEDICINE

## 2024-06-25 PROCEDURE — 80061 LIPID PANEL: CPT | Performed by: INTERNAL MEDICINE

## 2024-06-25 PROCEDURE — 87624 HPV HI-RISK TYP POOLED RSLT: CPT | Performed by: INTERNAL MEDICINE

## 2024-06-25 PROCEDURE — 90715 TDAP VACCINE 7 YRS/> IM: CPT | Performed by: INTERNAL MEDICINE

## 2024-06-25 PROCEDURE — 85027 COMPLETE CBC AUTOMATED: CPT | Performed by: INTERNAL MEDICINE

## 2024-06-25 RX ORDER — CHLORTHALIDONE 25 MG/1
12.5 TABLET ORAL DAILY
Qty: 90 TABLET | Refills: 3 | Status: SHIPPED | OUTPATIENT
Start: 2024-06-25 | End: 2024-07-01 | Stop reason: ALTCHOICE

## 2024-06-25 RX ORDER — ALBUTEROL SULFATE 90 UG/1
2 AEROSOL, METERED RESPIRATORY (INHALATION) EVERY 6 HOURS PRN
Qty: 1 G | Refills: 3 | Status: SHIPPED | OUTPATIENT
Start: 2024-06-25

## 2024-06-25 ASSESSMENT — PAIN SCALES - GENERAL: PAINLEVEL: NO PAIN (0)

## 2024-06-25 NOTE — PROGRESS NOTES
Preventive Care Visit  [facility]  Margot Banda MD, Internal Medicine  Jun 25, 2024      Anna was seen today for physical.    Diagnoses and all orders for this visit:    Routine general medical examination at a health care facility  Last colonoscopy 8/31/2020 showed polyps, due again 8/2025 at [facility] in [place].  Last mammo 8/2023 was nl, scheduled for her next one 8/2024  Declined Covid booster today, she's traveling to [place] soon and wants to receive it closer to her trip.  Patient will receive RSV vaccine at a pharmacy.  Counseled on diet and exercise.    Screening for HIV (human immunodeficiency virus)  -     HIV Antigen Antibody Combo; Future    Cervical cancer screening  -     Pap Screen with HPV - Recommended Age 30 - 65 Years  Pap today, results pending.    Hyperlipidemia, unspecified hyperlipidemia type  -     Lipid panel reflex to direct LDL Fasting; Future  Fhx hyperlipidemia    Medication management  -     Comprehensive metabolic panel; Future    Screening for thyroid disorder  -     TSH with free T4 reflex; Future    Screening for deficiency anemia  -     CBC with platelets; Future    Primary hypertension  -     chlorthalidone (HYGROTON) 25 MG tablet; Take 0.5 tablets (12.5 mg) by mouth daily  Takes chlorthalidone    Wheezing  -     albuterol (PROAIR HFA/PROVENTIL HFA/VENTOLIN HFA) 108 (90 Base) MCG/ACT inhaler; Inhale 2 puffs into the lungs every 6 hours as needed for shortness of breath or wheezing  Uses albuterol inhaler  Needs sparingly when get sick with cold    Tubular adenoma  Seen in 8/2019 colonoscopy    Screen for colon cancer  -     Adult GI  Referral - Procedure Only; Future  Referral sent, patient will call to schedule    Other orders  -     REVIEW OF HEALTH MAINTENANCE PROTOCOL ORDERS  -     PRIMARY CARE FOLLOW-UP SCHEDULING; Future    Need for vaccination  Received TDAP today.    Other  Progesterone Rx'd by a natural medicine provider.  Reported H.o  palpitations, advised to alert PCP if she notices sx again so we can monitor.    Subjective   Anna is a 59 year old, presenting for the following:  Physical     Health Care Directive  Patient does not have a Health Care Directive or Living Will: Patient states has Advance Directive and will bring in a copy to clinic.    HPI  Anna is a 59 year old, presenting for the following:  Physical        6/24/2024   General Health   How would you rate your overall physical health? Good   Feel stress (tense, anxious, or unable to sleep) Only a little      (!) STRESS CONCERN      6/24/2024   Nutrition   Three or more servings of calcium each day? (!) NO   Diet: Regular (no restrictions)   How many servings of fruit and vegetables per day? 4 or more   How many sweetened beverages each day? 0-1            6/24/2024   Exercise   Days per week of moderate/strenous exercise 7 days   Average minutes spent exercising at this level 40 min            6/24/2024   Social Factors   Frequency of gathering with friends or relatives Three times a week   Worry food won't last until get money to buy more No   Food not last or not have enough money for food? No   Do you have housing? (Housing is defined as stable permanent housing and does not include staying ouside in a car, in a tent, in an abandoned building, in an overnight shelter, or couch-surfing.) Yes   Are you worried about losing your housing? No   Lack of transportation? No   Unable to get utilities (heat,electricity)? No            6/24/2024   Fall Risk   Fallen 2 or more times in the past year? No   Trouble with walking or balance? No             6/24/2024   Dental   Dentist two times every year? Yes            6/24/2024   TB Screening   Were you born outside of the [place]? No            Today's PHQ-2 Score:       6/24/2024    10:05 AM   PHQ-2 ( 1999 [employer])   Q1: Little interest or pleasure in doing things 0   Q2: Feeling down, depressed or hopeless 0   PHQ-2 Score 0   Q1: Little  "interest or pleasure in doing things Not at all   Q2: Feeling down, depressed or hopeless Not at all   PHQ-2 Score 0           6/24/2024   Substance Use   Alcohol more than 3/day or more than 7/wk No   Do you use any other substances recreationally? No        Social History     Tobacco Use    Smoking status: Never    Smokeless tobacco: Never   Vaping Use    Vaping status: Never Used   Substance Use Topics    Alcohol use: Yes     Comment: 2 drinks per week    Drug use: No             6/24/2024   Breast Cancer Screening   Family history of breast, colon, or ovarian cancer? No / Unknown         Mammogram Screening - Mammogram every 1-2 years updated in Health Maintenance based on mutual decision making        6/24/2024   One time HIV Screening   Previous HIV test? I don't know          6/24/2024   STI Screening   New sexual partner(s) since last STI/HIV test? No      History of abnormal Pap smear: No - age 30- 64 PAP with HPV every 5 years recommended     ASCVD Risk   The 10-year ASCVD risk score (Toro DIAZ, et al., 2019) is: 5%    Values used to calculate the score:      Age: 59 years      Sex: Female      Is Non- : No      Diabetic: No      Tobacco smoker: No      Systolic Blood Pressure: 122 mmHg      Is BP treated: Yes      HDL Cholesterol: 40 mg/dL      Total Cholesterol: 180 mg/dL       Reviewed and updated as needed this visit by Provider                    Review of Systems  Constitutional, HEENT, cardiovascular, pulmonary, GI, , musculoskeletal, neuro, skin, endocrine and psych systems are negative, except as otherwise noted.     Objective    Exam  /80 (BP Location: Right arm, Patient Position: Sitting, Cuff Size: Adult Regular)   Pulse 66   Temp 97.4  F (36.3  C) (Temporal)   Resp 16   SpO2 99%    Estimated body mass index is 23.52 kg/m  as calculated from the following:    Height as of 6/14/23: 1.626 m (5' 4\").    Weight as of 6/14/23: 62.1 kg (137 " lb).    Physical Exam  GENERAL: alert and no distress  EYES: Eyes grossly normal to inspection, PERRL and conjunctivae and sclerae normal  HENT: ear canals and TM's normal, nose and mouth without ulcers or lesions  NECK: no adenopathy, no asymmetry, masses, or scars  RESP: lungs clear to auscultation - no rales, rhonchi or wheezes  BREAST: normal without masses, tenderness or nipple discharge and no palpable axillary masses or adenopathy  CV: regular rate and rhythm, normal S1 S2, no S3 or S4, no murmur, click or rub, no peripheral edema  ABDOMEN: soft, nontender, no hepatosplenomegaly, no masses and bowel sounds normal   (female): normal female external genitalia, normal urethral meatus, normal vaginal mucosa  Pap test is done   MS: no gross musculoskeletal defects noted, no edema  SKIN: no suspicious lesions or rashes  NEURO: Normal strength and tone, mentation intact and speech normal  PSYCH: mentation appears normal, affect normal/bright  LYMPH: no cervical, supraclavicular, axillary, or inguinal adenopathy    Labs pending    Signed Electronically by: Margot Banda MD    This document serves as a record of the services and decisions personally performed and made by Dr. Banda. It was created on her behalf by Ashleigh Morris, a trained medical scribe. The creation of this document is based the provider's statements to the medical scribe.

## 2024-06-25 NOTE — NURSING NOTE
Prior to immunization administration, verified patients identity using patient s name and date of birth. Please see Immunization Activity for additional information.     Screening Questionnaire for Adult Immunization    Are you sick today?   No   Do you have allergies to medications, food, a vaccine component or latex?   No   Have you ever had a serious reaction after receiving a vaccination?   No   Do you have a long-term health problem with heart, lung, kidney, or metabolic disease (e.g., diabetes), asthma, a blood disorder, no spleen, complement component deficiency, a cochlear implant, or a spinal fluid leak?  Are you on long-term aspirin therapy?   No   Do you have cancer, leukemia, HIV/AIDS, or any other immune system problem?   No   Do you have a parent, brother, or sister with an immune system problem?   No   In the past 3 months, have you taken medications that affect  your immune system, such as prednisone, other steroids, or anticancer drugs; drugs for the treatment of rheumatoid arthritis, Crohn s disease, or psoriasis; or have you had radiation treatments?   No   Have you had a seizure, or a brain or other nervous system problem?   No   During the past year, have you received a transfusion of blood or blood    products, or been given immune (gamma) globulin or antiviral drug?   No   For women: Are you pregnant or is there a chance you could become       pregnant during the next month?   No   Have you received any vaccinations in the past 4 weeks?   No     Immunization questionnaire answers were all negative.      Patient instructed to remain in clinic for 15 minutes afterwards, and to report any adverse reactions.     Screening performed by Lou Sandra MA on 6/25/2024 at 8:31 AM.

## 2024-06-25 NOTE — PATIENT INSTRUCTIONS
A healthy diet can improve your health and lower the risk of problems like heart disease, diabetes, high blood pressure, and some types of cancer.   It can also help you maintain a healthy body weight and improve overall quality of life.   In general, based on many different studies over time, experts recommend a diet that:  ?Includes lots of vegetables, fruits, beans, nuts, and whole grains  ?Limits red and processed meats, unhealthy fats, sugar, salt, and alcohol  Making healthy diet choices can reduce your risk of developing certain health problems and help you live longe      Respiratory syncytial virus (RSV) is an important cause of lower respiratory tract disease in older adults.   In 2023, the [place] Food and Drug Administration approved two recombinant RSV vaccines for the prevention of lower respiratory tract disease in individuals 60 years of age and older [1,2].     this vaccine prevented RSV-related respiratory infection and lower respiratory tract disease in adults age 60 years and older who received one dose of an AS01E-adjuvanted RSV Prefusion F Protein Vaccine [51].     This infection may cause serious infection like pneumonia in older patients     So it is good idea to get this RSV vaccine from any local pharmacy .  You are due for covid booster     Benefits of regular physical activity  Reduces the risk of dying prematurely.  Reduces the risk of dying from heart disease.  Reduces the risk of stroke.  Reduces the risk of developing diabetes.  Reduces the risk of developing high blood pressure.  Helps reduce blood pressure in people who already have high blood pressure.  Reduces the risk of developing colon cancer.  Reduces feelings of depression and anxiety  Helps control weight.  Helps build and maintain healthy bones, muscles and joints.  Helps older adults become stronger and better able to move about without falling.  Promotes psychological well-being.    Having high blood pressure puts you at risk  CC: H&P for cataract surgery  HPI: Patient has been diagnosed with cataracts, which are interfering with daily activities due to blurred vision and glare which cannot adequately be corrected with glasses.   PMH:  Past Medical History:   Diagnosis Date    Abnormal stress test 10/2016    Acne     Acute diastolic heart failure secondary to idiopathic cardiomyopathy 10/2016    Allergy     Anxiety     Arthritis     Carpal tunnel syndrome of right wrist     bilateral    Cataract     OS    Chest pain     Diabetes mellitus     Type 2 IDDM - Uncontrolled    Diabetic neuropathy     Difficult intubation     needed glidescope for cholecystectomy 2009    Dry scalp     Fatty liver     GERD (gastroesophageal reflux disease)     on no meds    Glaucoma     Hyperlipidemia     Hypertension     Insomnia     Knee pain     Morbid obesity     Neuropathy due to type 2 diabetes mellitus     anaya feet    MARLON (obstructive sleep apnea)     CPAP, says she is compliant with use    Parotid mass 2014    had Biopsy    SOB (shortness of breath) 10/2016    Tachycardia     frequently,chronic for years per chart       FH:  Family History   Problem Relation Age of Onset    Diabetes Mother     Kidney disease Mother     Hyperlipidemia Father     Heart disease Father     Hypertension Father     Diabetes Sister     Heart attacks under age 50 Brother     No Known Problems Maternal Grandmother     No Known Problems Maternal Grandfather     No Known Problems Paternal Grandmother     No Known Problems Paternal Grandfather     Diabetes Sister     Hepatitis Brother     Kidney failure Sister     Diabetes Sister     Amblyopia Neg Hx     Blindness Neg Hx     Cancer Neg Hx     Cataracts Neg Hx     Glaucoma Neg Hx     Macular degeneration Neg Hx     Retinal detachment Neg Hx     Strabismus Neg Hx     Stroke Neg Hx     Thyroid disease Neg Hx        SH:  Social History     Socioeconomic History    Marital status: Single      Spouse name: Not on file    Number of children: Not on file    Years of education: Not on file    Highest education level: Not on file   Social Needs    Financial resource strain: Not on file    Food insecurity - worry: Not on file    Food insecurity - inability: Not on file    Transportation needs - medical: Not on file    Transportation needs - non-medical: Not on file   Occupational History    Not on file   Tobacco Use    Smoking status: Never Smoker    Smokeless tobacco: Never Used   Substance and Sexual Activity    Alcohol use: Yes     Alcohol/week: 12.6 oz     Types: 21 Shots of liquor per week     Comment: Daily    Drug use: No    Sexual activity: Not on file   Other Topics Concern    Not on file   Social History Narrative    Not on file       ROS:  HEENT: Blurred vision  CV: No chest pain   Pulm: No SOB  Please see my last exam note for additional ROS details    PE:  BP:151/86  Pulse:103  HEENT: Cataract  CV: N S1 S2 no murmurs  Pulm:CTAB  Abd:soft NABS NTND  Extremeties:No edema    A/P  1. Cataract - Indications, risks and alternatives to the procedure were explained to the patient. The patient was given the opportunity to ask questions and consented to the procedure in writing.  Proceed with cataract surgery as scheduled.  2. Other health issues - patient has been cleared by their PCP. See last note for details.   "for heart attack, stroke, kidney damage, and other serious problems.   High blood pressure doesn't usually cause symptoms, so people sometimes don't take it seriously  The medicines your doctor or nurse prescribes to treat high blood pressure can help reduce the risk of these problems and even help you live longer.    You have a lot of control over your blood pressure. To lower it:  ?Lose weight (if you are overweight)  ?Choose a diet low in fat and rich in fruits, vegetables, and low-fat dairy products  ?Reduce the amount of salt you eat  ?Do something active for at least 30 minutes a day on most days of the week  ?Cut down on alcohol (if you drink more than 2 alcoholic drinks per day)    Follow up in one year for physical   Seek sooner medical attention if there is any worsening of symptoms or problems.       Patient Education   Preventive Care Advice   This is general advice we often give to help people stay healthy. Your care team may have specific advice just for you. Please talk to your care team about your own preventive care needs.  Lifestyle  Exercise at least 150 minutes each week (30 minutes a day, 5 days a week).  Do muscle strengthening activities 2 days a week. These help control your weight and prevent disease.  No smoking.  Wear sunscreen to prevent skin cancer.  Have your home tested for radon every 2 to 5 years. Radon is a colorless, odorless gas that can harm your lungs. To learn more, go to www.health.[facility].mn.us and search for \"Radon in Homes.\"  Keep guns unloaded and locked up in a safe place like a safe or gun vault, or, use a gun lock and hide the keys. Always lock away bullets separately. To learn more, visit [employer].[place].gov and search for \"safe gun storage.\"  Nutrition  Eat 5 or more servings of fruits and vegetables each day.  Try wheat bread, brown rice and whole grain pasta (instead of white bread, rice, and pasta).  Get enough calcium and vitamin D. Check the label on foods and aim for 100% " of the RDA (recommended daily allowance).  Regular exams  Have a dental exam and cleaning every 6 months.  See your health care team every year to talk about:  Any changes in your health.  Any medicines your care team has prescribed.  Preventive care, family planning, and ways to prevent chronic diseases.  Shots (vaccines)   HPV shots (up to age 26), if you've never had them before.  Hepatitis B shots (up to age 59), if you've never had them before.  COVID-19 shot: Get this shot when it's due.  Flu shot: Get a flu shot every year.  Tetanus shot: Get a tetanus shot every 10 years.  Pneumococcal, hepatitis A, and RSV shots: Ask your care team if you need these based on your risk.  Shingles shot (for age 50 and up).  General health tests  Diabetes screening:  Starting at age 35, Get screened for diabetes at least every 3 years.  If you are younger than age 35, ask your care team if you should be screened for diabetes.  Cholesterol test: At age 39, start having a cholesterol test every 5 years, or more often if advised.  Bone density scan (DEXA): At age 50, ask your care team if you should have this scan for osteoporosis (brittle bones).  Hepatitis C: Get tested at least once in your life.  Abdominal aortic aneurysm screening: Talk to your doctor about having this screening if you:  Have ever smoked; and  Are biologically male; and  Are between the ages of 65 and 75.  STIs (sexually transmitted infections)  Before age 24: Ask your care team if you should be screened for STIs.  After age 24: Get screened for STIs if you're at risk. You are at risk for STIs (including HIV) if:  You are sexually active with more than one person.  You don't use condoms every time.  You or a partner was diagnosed with a sexually transmitted infection.  If you are at risk for HIV, ask about PrEP medicine to prevent HIV.  Get tested for HIV at least once in your life, whether you are at risk for HIV or not.  Cancer screening tests  Cervical  cancer screening: If you have a cervix, begin getting regular cervical cancer screening tests at age 21. Most people who have regular screenings with normal results can stop after age 65. Talk about this with your provider.  Breast cancer scan (mammogram): If you've ever had breasts, begin having regular mammograms starting at age 40. This is a scan to check for breast cancer.  Colon cancer screening: It is important to start screening for colon cancer at age 45.  Have a colonoscopy test every 10 years (or more often if you're at risk) Or, ask your provider about stool tests like a FIT test every year or Cologuard test every 3 years.  To learn more about your testing options, visit: www.[employer]/502144.pdf.  For help making a decision, visit: tobias/hk29013.  Prostate cancer screening test: If you have a prostate and are age 55 to 69, ask your provider if you would benefit from a yearly prostate cancer screening test.  Lung cancer screening: If you are a current or former smoker age 50 to 80, ask your care team if ongoing lung cancer screenings are right for you.  For informational purposes only. Not to replace the advice of your health care provider. Copyright   2023 [employer]. All rights reserved. Clinically reviewed by the [facility] Transitions Program. [employer] [id number] - REV 04/24.

## 2024-06-26 LAB
HPV HR 12 DNA CVX QL NAA+PROBE: NEGATIVE
HPV16 DNA CVX QL NAA+PROBE: NEGATIVE
HPV18 DNA CVX QL NAA+PROBE: NEGATIVE
HUMAN PAPILLOMA VIRUS FINAL DIAGNOSIS: NORMAL

## 2024-06-26 RX ORDER — POTASSIUM CHLORIDE 750 MG/1
10 TABLET, EXTENDED RELEASE ORAL DAILY
Qty: 90 TABLET | Refills: 1 | Status: SHIPPED | OUTPATIENT
Start: 2024-06-26 | End: 2024-07-01

## 2024-06-28 ENCOUNTER — MYC MEDICAL ADVICE (OUTPATIENT)
Dept: FAMILY MEDICINE | Facility: CLINIC | Age: 59
End: 2024-06-28
Payer: COMMERCIAL

## 2024-06-28 DIAGNOSIS — Z79.899 MEDICATION MANAGEMENT: ICD-10-CM

## 2024-06-28 DIAGNOSIS — I10 PRIMARY HYPERTENSION: Primary | ICD-10-CM

## 2024-06-30 LAB
BKR LAB AP GYN ADEQUACY: NORMAL
BKR LAB AP GYN INTERPRETATION: NORMAL
BKR LAB AP PREVIOUS ABNORMAL: NORMAL
PATH REPORT.COMMENTS IMP SPEC: NORMAL
PATH REPORT.COMMENTS IMP SPEC: NORMAL
PATH REPORT.RELEVANT HX SPEC: NORMAL

## 2024-07-01 RX ORDER — LOSARTAN POTASSIUM 25 MG/1
25 TABLET ORAL DAILY
Qty: 90 TABLET | Refills: 1 | Status: SHIPPED | OUTPATIENT
Start: 2024-07-01

## 2024-07-01 NOTE — TELEPHONE ENCOUNTER
Please see [employer] message and advise.     Patient has read provider's note from lab results 6/25/24 and has sent a [employer] message in response. Pharmacy is pended if needed.    Sindhu GARAY RN  [facility] Triage Team

## 2024-07-29 ENCOUNTER — LAB (OUTPATIENT)
Dept: LAB | Facility: CLINIC | Age: 59
End: 2024-07-29
Payer: COMMERCIAL

## 2024-07-29 DIAGNOSIS — Z11.4 SCREENING FOR HIV (HUMAN IMMUNODEFICIENCY VIRUS): Primary | ICD-10-CM

## 2024-07-29 DIAGNOSIS — E87.6 HYPOKALEMIA: ICD-10-CM

## 2024-07-29 LAB
ANION GAP SERPL CALCULATED.3IONS-SCNC: 10 MMOL/L (ref 7–15)
BUN SERPL-MCNC: 13 MG/DL (ref 8–23)
CALCIUM SERPL-MCNC: 9.6 MG/DL (ref 8.8–10.4)
CHLORIDE SERPL-SCNC: 100 MMOL/L (ref 98–107)
CREAT SERPL-MCNC: 0.67 MG/DL (ref 0.51–0.95)
EGFRCR SERPLBLD CKD-EPI 2021: >90 ML/MIN/1.73M2
GLUCOSE SERPL-MCNC: 90 MG/DL (ref 70–99)
HCO3 SERPL-SCNC: 30 MMOL/L (ref 22–29)
POTASSIUM SERPL-SCNC: 3.6 MMOL/L (ref 3.4–5.3)
SODIUM SERPL-SCNC: 140 MMOL/L (ref 135–145)

## 2024-07-29 PROCEDURE — 80048 BASIC METABOLIC PNL TOTAL CA: CPT

## 2024-07-29 PROCEDURE — 36415 COLL VENOUS BLD VENIPUNCTURE: CPT

## 2024-07-29 NOTE — RESULT ENCOUNTER NOTE
Conchis Castillo    This is to inform you regarding your test result.    Basic metabolic panel which includes electrolytes and kidney fucntion is satisfactory       Sincerely,      Dr.Nasima Veronika MD,FACP

## 2024-08-18 ENCOUNTER — MYC REFILL (OUTPATIENT)
Dept: FAMILY MEDICINE | Facility: CLINIC | Age: 59
End: 2024-08-18
Payer: COMMERCIAL

## 2024-08-18 DIAGNOSIS — J31.0 CHRONIC RHINITIS: Primary | ICD-10-CM

## 2024-08-20 RX ORDER — AZELASTINE 1 MG/ML
1 SPRAY, METERED NASAL 2 TIMES DAILY
Qty: 30 ML | Refills: 0 | Status: SHIPPED | OUTPATIENT
Start: 2024-08-20

## 2024-08-30 ENCOUNTER — TRANSFERRED RECORDS (OUTPATIENT)
Dept: HEALTH INFORMATION MANAGEMENT | Facility: CLINIC | Age: 59
End: 2024-08-30
Payer: COMMERCIAL

## 2025-03-04 DIAGNOSIS — J31.0 CHRONIC RHINITIS: ICD-10-CM

## 2025-03-05 RX ORDER — AZELASTINE 1 MG/ML
1 SPRAY, METERED NASAL 2 TIMES DAILY
Qty: 30 ML | Refills: 0 | Status: SHIPPED | OUTPATIENT
Start: 2025-03-05

## 2025-03-11 ENCOUNTER — NURSE TRIAGE (OUTPATIENT)
Dept: FAMILY MEDICINE | Facility: CLINIC | Age: 60
End: 2025-03-11
Payer: COMMERCIAL

## 2025-03-11 NOTE — TELEPHONE ENCOUNTER
"Patient called the clinic back regarding her [employer] message. She stated that she has been having high blood pressure and chest pain and headache. During the call her blood pressure was 167/91 and 157/87 and 165/90. Advised patient to be seen in the ED right away. She stated understanding and agreed with the plan. She had no further questions at this time.     Reason for Disposition   Systolic BP >= 160 OR Diastolic >= 100, and any cardiac (e.g., breathing difficulty, chest pain) or neurologic symptoms (e.g., new-onset blurred or double vision)    Additional Information   Negative: Sounds like a life-threatening emergency to the triager   Negative: Symptom is main concern (e.g., headache, chest pain)   Negative: Low blood pressure is main concern    Answer Assessment - Initial Assessment Questions  1. BLOOD PRESSURE: \"What is your blood pressure?\" \"Did you take at least two measurements 5 minutes apart?\"        167/91 and 157/87    2. ONSET: \"When did you take your blood pressure?\"        While on the phone    3. HOW: \"How did you take your blood pressure?\" (e.g., automatic home BP monitor, visiting nurse)        Automatic home BP monitor    4. HISTORY: \"Do you have a history of high blood pressure?\"        Yes     5. MEDICINES: \"Are you taking any medicines for blood pressure?\" \"Have you missed any doses recently?\"        Losartan 25 mg daily    6. OTHER SYMPTOMS: \"Do you have any symptoms?\" (e.g., blurred vision, chest pain, difficulty breathing, headache, weakness)        Headache, chest pain (left side) occasional    7. PREGNANCY: \"Is there any chance you are pregnant?\" \"When was your last menstrual period?\"        No    Protocols used: Blood Pressure - High-A-OH    Sindhu GARAY RN  [facility] Triage Team    "

## 2025-03-12 ENCOUNTER — OFFICE VISIT (OUTPATIENT)
Dept: FAMILY MEDICINE | Facility: CLINIC | Age: 60
End: 2025-03-12
Payer: COMMERCIAL

## 2025-03-12 VITALS
TEMPERATURE: 98.2 F | HEIGHT: 64 IN | BODY MASS INDEX: 24.86 KG/M2 | RESPIRATION RATE: 18 BRPM | SYSTOLIC BLOOD PRESSURE: 137 MMHG | DIASTOLIC BLOOD PRESSURE: 86 MMHG | OXYGEN SATURATION: 97 % | HEART RATE: 73 BPM | WEIGHT: 145.6 LBS

## 2025-03-12 DIAGNOSIS — I10 PRIMARY HYPERTENSION: Primary | ICD-10-CM

## 2025-03-12 DIAGNOSIS — M62.89 MUSCLE TIGHTNESS: ICD-10-CM

## 2025-03-12 DIAGNOSIS — G44.201 INTRACTABLE TENSION-TYPE HEADACHE, UNSPECIFIED CHRONICITY PATTERN: ICD-10-CM

## 2025-03-12 DIAGNOSIS — Z13.1 SCREENING FOR DIABETES MELLITUS: ICD-10-CM

## 2025-03-12 DIAGNOSIS — R00.2 HEART PALPITATIONS: ICD-10-CM

## 2025-03-12 PROBLEM — H10.9 CONJUNCTIVITIS OF BOTH EYES, UNSPECIFIED CONJUNCTIVITIS TYPE: Status: RESOLVED | Noted: 2023-06-14 | Resolved: 2025-03-12

## 2025-03-12 PROBLEM — R06.2 WHEEZING: Status: RESOLVED | Noted: 2023-06-14 | Resolved: 2025-03-12

## 2025-03-12 LAB
BASOPHILS # BLD AUTO: 0 10E3/UL (ref 0–0.2)
BASOPHILS NFR BLD AUTO: 0 %
EOSINOPHIL # BLD AUTO: 0.1 10E3/UL (ref 0–0.7)
EOSINOPHIL NFR BLD AUTO: 2 %
ERYTHROCYTE [DISTWIDTH] IN BLOOD BY AUTOMATED COUNT: 12.1 % (ref 10–15)
EST. AVERAGE GLUCOSE BLD GHB EST-MCNC: 103 MG/DL
HBA1C MFR BLD: 5.2 % (ref 0–5.6)
HCT VFR BLD AUTO: 39.4 % (ref 35–47)
HGB BLD-MCNC: 13.4 G/DL (ref 11.7–15.7)
IMM GRANULOCYTES # BLD: 0 10E3/UL
IMM GRANULOCYTES NFR BLD: 0 %
LYMPHOCYTES # BLD AUTO: 1.7 10E3/UL (ref 0.8–5.3)
LYMPHOCYTES NFR BLD AUTO: 26 %
MCH RBC QN AUTO: 30.7 PG (ref 26.5–33)
MCHC RBC AUTO-ENTMCNC: 34 G/DL (ref 31.5–36.5)
MCV RBC AUTO: 90 FL (ref 78–100)
MONOCYTES # BLD AUTO: 0.3 10E3/UL (ref 0–1.3)
MONOCYTES NFR BLD AUTO: 5 %
NEUTROPHILS # BLD AUTO: 4.4 10E3/UL (ref 1.6–8.3)
NEUTROPHILS NFR BLD AUTO: 67 %
PLATELET # BLD AUTO: 208 10E3/UL (ref 150–450)
RBC # BLD AUTO: 4.36 10E6/UL (ref 3.8–5.2)
WBC # BLD AUTO: 6.5 10E3/UL (ref 4–11)

## 2025-03-12 PROCEDURE — 85025 COMPLETE CBC W/AUTO DIFF WBC: CPT | Performed by: NURSE PRACTITIONER

## 2025-03-12 PROCEDURE — 83036 HEMOGLOBIN GLYCOSYLATED A1C: CPT | Performed by: NURSE PRACTITIONER

## 2025-03-12 PROCEDURE — 36415 COLL VENOUS BLD VENIPUNCTURE: CPT | Performed by: NURSE PRACTITIONER

## 2025-03-12 RX ORDER — LOSARTAN POTASSIUM 50 MG/1
50 TABLET ORAL DAILY
Qty: 90 TABLET | Refills: 1 | Status: SHIPPED | OUTPATIENT
Start: 2025-03-12

## 2025-03-12 NOTE — PATIENT INSTRUCTIONS
Take your meds as prescribed.    Scripts / refills sent to your pharmacy.     Continue with losartan 50 mg once daily.      EKG and labs today.     Zio patch today.     May follow-up with Carlos Izaguirre NP as needed.      Increase water (64 ounces per day), fruits, and vegetables.    Exercise at least 5 days per week for 30 minutes each day.     Follow-up with me in 3-4 weeks or sooner as needed.       Call or MyChart as needed as well.

## 2025-03-12 NOTE — PROGRESS NOTES
Assessment & Plan   (I10) Primary hypertension  (primary encounter diagnosis)  Comment: Controlled.  Will continue with losartan at the 50 mg dose.  Recheck in 3-4 weeks.  See patient instructions.  Labs today.     Plan: losartan (COZAAR) 50 MG tablet, EKG 12-lead         complete w/read - Clinics, CBC with Platelets &        Differential, Comprehensive metabolic panel,         TSH with free T4 reflex, ZIO PATCH 8-14 DAYS         (additional cost to patient), ZIO PATCH 8-14         DAYS APPLICATION    (R00.2) Heart palpitations  Comment:  Chronic.  Intermittent.  EKG is essentially normal today and no change since 2023.  Will check labs and start Zio Patch.  Possible Echocardiogram and Cardiology consult in future.   Plan: EKG 12-lead complete w/read - Clinics, CBC with        Platelets & Differential, Comprehensive         metabolic panel, TSH with free T4 reflex, ZIO         PATCH 8-14 DAYS (additional cost to patient),         ZIO PATCH 8-14 DAYS APPLICATION    (G44.201) Intractable tension-type headache, unspecified chronicity pattern  Comment: New onset.  Persistent.  Tylenol and ibuprofen as needed.          (M62.89) Muscle tightness  Comment: New onset.  Discussed muscle relaxant at night for muscle tightness and headache.  Tylenol or Ibuprofen as needed.  Could rest, ice, heat, or gentle stretches.  Could start PT.  Will discuss in more detail at follow-up.     (Z13.1) Screening for diabetes mellitus  Comment:   Plan: Hemoglobin A1c        Patient Instructions   Take your meds as prescribed.    Scripts / refills sent to your pharmacy.     Continue with losartan 50 mg once daily.      EKG and labs today.     Zio patch today.     May follow-up with Carlos Izaguirre NP as needed.      Increase water (64 ounces per day), fruits, and vegetables.    Exercise at least 5 days per week for 30 minutes each day.     Follow-up with me in 3-4 weeks or sooner as needed.       Call or MyChart as needed as well.         The  "longitudinal plan of care for the diagnosis(es)/condition(s) as documented were addressed during this visit. Due to the added complexity in care, I will continue to support Anna in the subsequent management and with ongoing continuity of care.      BMI  Estimated body mass index is 25.39 kg/m  as calculated from the following:    Height as of this encounter: 1.613 m (5' 3.5\").    Weight as of this encounter: 66 kg (145 lb 9.6 oz).   Weight management plan: Discussed healthy diet and exercise guidelines        Subjective   Anna is a 59 year old, presenting for the following health issues:  Hypertension (C/O severe headaches X 5 days, Hx pvc)        3/12/2025     1:18 PM   Additional Questions   Roomed by Anige     History of Present Illness       Hypertension: She presents for follow up of hypertension.  She does not check blood pressure  regularly outside of the clinic. Outside blood pressures have been over 140/90. She follows a low salt diet.     She eats 2-3 servings of fruits and vegetables daily.She consumes 0 sweetened beverage(s) daily.She exercises with enough effort to increase her heart rate 30 to 60 minutes per day.  She exercises with enough effort to increase her heart rate 4 days per week.   She is taking medications regularly.      Hypertension Follow-up  Do you check your blood pressure regularly outside of the clinic? No, but did check it when she had a headache.    Are you following a low salt diet? Yes  Are your blood pressures ever more than 140 on the top number (systolic) OR more   than 90 on the bottom number (diastolic), for example 140/90? Yes  How many servings of fruits and vegetables do you eat daily?  2-3  On average, how many sweetened beverages do you drink each day (Examples: soda, juice, sweet tea, etc.  Do NOT count diet or artificially sweetened beverages)?   0  How many days per week do you exercise enough to make your heart beat faster? 4  How many minutes a day do you exercise " "enough to make your heart beat faster? 30 - 60  How many days per week do you miss taking your medication? 0  She increased her losartan to 50 mg once daily a couple days ago and this is going well.       Irregular heart beat:  Intermittently for the past 3 days.  Nothing today.  Wonders about additional work-up.  Has not seen Cardiology.        Headache:  Located on top and frontal part  of  head.  Feels throbbing at times  Hard to describes.  Headaches since Saturday 3-8-25.  Takes Ibuprofen alternating with Tylenol.  This helpful.  Headache has been better and then got worse.  Rates pain 4/10.        Anna Smith arrived here on 3/12/2025 2:53 PM for 8-14 Days  Zio monitor placement per ordering provider Ashley Katz for the diagnosis Heart Palpitations.  Patient s skin was prepped per protocol.  Zio monitor was placed.  Instructions were reviewed with and given to the patient.  Patient verbalized understanding of wear, troubleshooting and monitor return instructions.  Raegan Barton CMA    Review of Systems  Constitutional, neuro, ENT, endocrine, pulmonary, cardiac, gastrointestinal, genitourinary, musculoskeletal, integument and psychiatric systems are negative, except as otherwise noted.        Objective    /86 (BP Location: Right arm, Patient Position: Sitting, Cuff Size: Adult Regular)   Pulse 73   Temp 98.2  F (36.8  C) (Oral)   Resp 18   Ht 1.613 m (5' 3.5\")   Wt 66 kg (145 lb 9.6 oz)   SpO2 97%   BMI 25.39 kg/m    Body mass index is 25.39 kg/m .  Physical Exam  Constitutional:       General: She is not in acute distress.     Appearance: Normal appearance. She is not ill-appearing.   HENT:      Head: Normocephalic.   Cardiovascular:      Rate and Rhythm: Normal rate and regular rhythm.      Heart sounds: Normal heart sounds. No murmur heard.     No friction rub. No gallop.   Pulmonary:      Effort: Pulmonary effort is normal. No respiratory distress.      Breath sounds: Normal breath " sounds. No wheezing, rhonchi or rales.   Musculoskeletal:      Cervical back: Normal range of motion and neck supple. No rigidity.   Neurological:      General: No focal deficit present.      Mental Status: She is alert.   Psychiatric:         Mood and Affect: Mood normal.         Behavior: Behavior normal.         Thought Content: Thought content normal.         Judgment: Judgment normal.              Signed Electronically by: TRUE Gillespie CNP

## 2025-03-17 ENCOUNTER — PATIENT OUTREACH (OUTPATIENT)
Dept: CARE COORDINATION | Facility: CLINIC | Age: 60
End: 2025-03-17
Payer: COMMERCIAL

## 2025-03-31 LAB — CV ZIO PRELIM RESULTS: NORMAL

## 2025-04-08 ENCOUNTER — OFFICE VISIT (OUTPATIENT)
Dept: FAMILY MEDICINE | Facility: CLINIC | Age: 60
End: 2025-04-08
Payer: COMMERCIAL

## 2025-04-08 VITALS
DIASTOLIC BLOOD PRESSURE: 74 MMHG | SYSTOLIC BLOOD PRESSURE: 121 MMHG | HEIGHT: 64 IN | OXYGEN SATURATION: 95 % | RESPIRATION RATE: 20 BRPM | WEIGHT: 144.2 LBS | HEART RATE: 68 BPM | TEMPERATURE: 98.5 F | BODY MASS INDEX: 24.62 KG/M2

## 2025-04-08 DIAGNOSIS — I47.20 VENTRICULAR TACHYCARDIA (H): ICD-10-CM

## 2025-04-08 DIAGNOSIS — I10 PRIMARY HYPERTENSION: Primary | ICD-10-CM

## 2025-04-08 PROCEDURE — 1126F AMNT PAIN NOTED NONE PRSNT: CPT | Performed by: PHYSICIAN ASSISTANT

## 2025-04-08 PROCEDURE — 99213 OFFICE O/P EST LOW 20 MIN: CPT | Performed by: PHYSICIAN ASSISTANT

## 2025-04-08 PROCEDURE — 3078F DIAST BP <80 MM HG: CPT | Performed by: PHYSICIAN ASSISTANT

## 2025-04-08 PROCEDURE — 3074F SYST BP LT 130 MM HG: CPT | Performed by: PHYSICIAN ASSISTANT

## 2025-04-08 ASSESSMENT — PAIN SCALES - GENERAL: PAINLEVEL_OUTOF10: NO PAIN (0)

## 2025-04-08 NOTE — PROGRESS NOTES
"  Assessment & Plan     Primary hypertension  Well controlled with adjustments to losartan. Continue on 50 mg daily. Electrolytes and kidney function normal when last checked.    Ventricular tachycardia (H)  One episode of VT noted in the middle of the night on heart monitor. Also one episode of SVT. I recommended following up with cardiology to see if anything further is needed. Referral placed.  - Adult Cardiology Eval  Referral; Future    Review of external notes as documented elsewhere in note  Review of the result(s) of each unique test - ZioPatch  Prescription drug management  20 minutes spent by me on the date of the encounter doing chart review, history and exam, documentation and further activities per the note    Subjective   Anna is a 60 year old, presenting for the following health issues:  Hypertension        4/8/2025     2:42 PM   Additional Questions   Roomed by juan m hogue   Accompanied by self     History of Present Illness       Hypertension: She presents for follow up of hypertension.  She does not check blood pressure  regularly outside of the clinic. Outpatient blood pressures have not been over 140/90. She follows a low salt diet.     She eats 2-3 servings of fruits and vegetables daily.She consumes 0 sweetened beverage(s) daily.She exercises with enough effort to increase her heart rate 30 to 60 minutes per day.  She exercises with enough effort to increase her heart rate 4 days per week.   She is taking medications regularly.        Patient report she is feeling well. The headache and \"strange sensation\" in the chest are now gone.        Objective    /74 (BP Location: Left arm, Patient Position: Chair, Cuff Size: Adult Regular)   Pulse 68   Temp 98.5  F (36.9  C) (Temporal)   Resp 20   Ht 1.613 m (5' 3.5\")   Wt 65.4 kg (144 lb 3.2 oz)   SpO2 95%   BMI 25.14 kg/m    Body mass index is 25.14 kg/m .  Physical Exam   GENERAL: No acute distress  HEENT: Normocephalic  NEURO: Alert " and non-focal          Signed Electronically by: Jocelyne Arzate PA-C

## 2025-06-30 SDOH — HEALTH STABILITY: PHYSICAL HEALTH: ON AVERAGE, HOW MANY DAYS PER WEEK DO YOU ENGAGE IN MODERATE TO STRENUOUS EXERCISE (LIKE A BRISK WALK)?: 6 DAYS

## 2025-06-30 SDOH — HEALTH STABILITY: PHYSICAL HEALTH: ON AVERAGE, HOW MANY MINUTES DO YOU ENGAGE IN EXERCISE AT THIS LEVEL?: 40 MIN

## 2025-06-30 ASSESSMENT — SOCIAL DETERMINANTS OF HEALTH (SDOH): HOW OFTEN DO YOU GET TOGETHER WITH FRIENDS OR RELATIVES?: THREE TIMES A WEEK

## 2025-07-03 ENCOUNTER — OFFICE VISIT (OUTPATIENT)
Dept: FAMILY MEDICINE | Facility: CLINIC | Age: 60
End: 2025-07-03
Payer: COMMERCIAL

## 2025-07-03 VITALS
BODY MASS INDEX: 24.63 KG/M2 | OXYGEN SATURATION: 100 % | RESPIRATION RATE: 14 BRPM | DIASTOLIC BLOOD PRESSURE: 85 MMHG | SYSTOLIC BLOOD PRESSURE: 122 MMHG | WEIGHT: 139 LBS | TEMPERATURE: 98 F | HEIGHT: 63 IN | HEART RATE: 65 BPM

## 2025-07-03 DIAGNOSIS — I10 PRIMARY HYPERTENSION: ICD-10-CM

## 2025-07-03 DIAGNOSIS — E78.5 HYPERLIPIDEMIA LDL GOAL <100: ICD-10-CM

## 2025-07-03 DIAGNOSIS — G47.33 OBSTRUCTIVE SLEEP APNEA SYNDROME: ICD-10-CM

## 2025-07-03 DIAGNOSIS — Z23 NEED FOR VACCINATION: ICD-10-CM

## 2025-07-03 DIAGNOSIS — Z00.00 ANNUAL PHYSICAL EXAM: Primary | ICD-10-CM

## 2025-07-03 DIAGNOSIS — G47.00 INSOMNIA, UNSPECIFIED TYPE: ICD-10-CM

## 2025-07-03 LAB
CHOLEST SERPL-MCNC: 215 MG/DL
FASTING STATUS PATIENT QL REPORTED: YES
HDLC SERPL-MCNC: 60 MG/DL
LDLC SERPL CALC-MCNC: 139 MG/DL
NONHDLC SERPL-MCNC: 155 MG/DL
TRIGL SERPL-MCNC: 82 MG/DL

## 2025-07-03 PROCEDURE — 3079F DIAST BP 80-89 MM HG: CPT | Performed by: INTERNAL MEDICINE

## 2025-07-03 PROCEDURE — 99396 PREV VISIT EST AGE 40-64: CPT | Mod: 25 | Performed by: INTERNAL MEDICINE

## 2025-07-03 PROCEDURE — 80061 LIPID PANEL: CPT | Performed by: INTERNAL MEDICINE

## 2025-07-03 PROCEDURE — 90677 PCV20 VACCINE IM: CPT | Performed by: INTERNAL MEDICINE

## 2025-07-03 PROCEDURE — 90471 IMMUNIZATION ADMIN: CPT | Performed by: INTERNAL MEDICINE

## 2025-07-03 PROCEDURE — 3050F LDL-C >= 130 MG/DL: CPT | Performed by: INTERNAL MEDICINE

## 2025-07-03 PROCEDURE — 36415 COLL VENOUS BLD VENIPUNCTURE: CPT | Performed by: INTERNAL MEDICINE

## 2025-07-03 PROCEDURE — 1126F AMNT PAIN NOTED NONE PRSNT: CPT | Performed by: INTERNAL MEDICINE

## 2025-07-03 PROCEDURE — 99213 OFFICE O/P EST LOW 20 MIN: CPT | Mod: 25 | Performed by: INTERNAL MEDICINE

## 2025-07-03 PROCEDURE — 3074F SYST BP LT 130 MM HG: CPT | Performed by: INTERNAL MEDICINE

## 2025-07-03 RX ORDER — LANOLIN ALCOHOL/MO/W.PET/CERES
1000 CREAM (GRAM) TOPICAL DAILY
COMMUNITY

## 2025-07-03 ASSESSMENT — PAIN SCALES - GENERAL: PAINLEVEL_OUTOF10: NO PAIN (0)

## 2025-07-03 NOTE — PROGRESS NOTES
Preventive Care Visit  [facility] YANIRA Thomas MD, Internal Medicine  Jul 3, 2025      Assessment & Plan     Annual physical exam  Due for colonoscopy - scheduled in sept 2025  UTD with mammogram and pap   Due for pcv20  Labs reviewed.     Hyperlipidemia LDL goal <100  The 10-year ASCVD risk score (Toro DIAZ, et al., 2019) is: 5%    Values used to calculate the score:      Age: 60 years      Sex: Female      Is Non- : No      Diabetic: No      Tobacco smoker: No      Systolic Blood Pressure: 122 mmHg      Is BP treated: Yes      HDL Cholesterol: 60 mg/dL      Total Cholesterol: 215 mg/dL  - Lipid panel reflex to direct LDL Fasting; Future  - Lipid panel reflex to direct LDL Fasting    Primary hypertension  Currently on losartan 50 mg daily. BP well controlled.  Continue med.     Obstructive sleep apnea syndrome  She has sleep apnea on CPAP    Need for vaccination    Insomnia, unspecified type  She is on progesterone - premenopausal symptoms started 18 years ago. She is using it for insomnia. This is being managed by an NP at a hormone clinic. Advised patient to discuss about possible discontinuation if symptoms are well controlled.      Patient has been advised of split billing requirements and indicates understanding: Yes      Reviewed preventive health counseling, as reflected in patient instructions       Osteoporosis prevention/bone health  Counseling  Appropriate preventive services were addressed with this patient via screening, questionnaire, or discussion as appropriate for fall prevention, nutrition, physical activity, Tobacco-use cessation, social engagement, weight loss and cognition.  Checklist reviewing preventive services available has been given to the patient.  Reviewed patient's diet, addressing concerns and/or questions.         Follow-up  No follow-ups on file.    Ana Castillo is a 60 year old, presenting for the following:  Physical  (fasting)        7/3/2025     8:14 AM   Additional Questions   Roomed by Jeanette Smith is here for APE      Advance Care Planning    Advance care planning document is on file but is outdated.  Patient encouraged to update or provider to update POLST.        6/30/2025   General Health   How would you rate your overall physical health? Good   Feel stress (tense, anxious, or unable to sleep) Only a little   (!) STRESS CONCERN      6/30/2025   Nutrition   Three or more servings of calcium each day? Yes   Diet: Low salt   How many servings of fruit and vegetables per day? (!) 2-3   How many sweetened beverages each day? 0-1         6/30/2025   Exercise   Days per week of moderate/strenous exercise 6 days   Average minutes spent exercising at this level 40 min         6/30/2025   Social Factors   Frequency of gathering with friends or relatives Three times a week   Worry food won't last until get money to buy more No   Food not last or not have enough money for food? No   Do you have housing? (Housing is defined as stable permanent housing and does not include staying outside in a car, in a tent, in an abandoned building, in an overnight shelter, or couch-surfing.) Yes   Are you worried about losing your housing? No   Lack of transportation? No   Unable to get utilities (heat,electricity)? No         6/30/2025   Fall Risk   Fallen 2 or more times in the past year? No   Trouble with walking or balance? No          6/30/2025   Dental   Dentist two times every year? Yes           Today's PHQ-2 Score:       3/12/2025     1:09 PM   PHQ-2 ( 1999 [employer])   Q1: Little interest or pleasure in doing things 0   Q2: Feeling down, depressed or hopeless 0   PHQ-2 Score 0    Q1: Little interest or pleasure in doing things Not at all   Q2: Feeling down, depressed or hopeless Not at all   PHQ-2 Score 0       Patient-reported         6/30/2025   Substance Use   Alcohol more than 3/day or more than 7/wk No   Do you use  any other substances recreationally? No     Social History     Tobacco Use    Smoking status: Never    Smokeless tobacco: Never   Vaping Use    Vaping status: Never Used   Substance Use Topics    Alcohol use: Yes     Comment: social    Drug use: No          Mammogram Screening - Mammogram every 1-2 years updated in Health Maintenance based on mutual decision making          2025   One time HIV Screening   Previous HIV test? No         2025   STI Screening   New sexual partner(s) since last STI/HIV test? No     History of abnormal Pap smear: No - age 30- 64 PAP with HPV every 5 years recommended        Latest Ref Rng & Units 2024     8:11 AM   PAP / HPV   PAP  Negative for Intraepithelial Lesion or Malignancy (NILM)    HPV 16 DNA Negative Negative    HPV 18 DNA Negative Negative    Other HR HPV Negative Negative      ASCVD Risk   The 10-year ASCVD risk score (Toro DIAZ, et al., 2019) is: 5.7%    Values used to calculate the score:      Age: 60 years      Sex: Female      Is Non- : No      Diabetic: No      Tobacco smoker: No      Systolic Blood Pressure: 122 mmHg      Is BP treated: Yes      HDL Cholesterol: 53 mg/dL      Total Cholesterol: 237 mg/dL    Fracture Risk Assessment Tool  Link to Frax Calculator  Use the information below to complete the Frax calculator  : 1965  Sex: female  Weight (kg): 63.1 kg (actual weight)  Height (cm): 160.8 cm  Previous Fragility Fracture:  No  History of parent with fractured hip:  No  Current Smoking:  No  Patient has been on glucocorticoids for more than 3 months (5mg/day or more): No  Rheumatoid Arthritis on Problem List:  No  Secondary Osteoporosis on Problem List:  No  Consumes 3 or more units of alcohol per day: No  Femoral Neck BMD (g/cm2)    Reviewed and updated as needed this visit by Provider     Meds                   Objective    Exam  /85 (BP Location: Right arm, Patient Position: Sitting, Cuff Size: Adult  "Regular)   Pulse 65   Temp 98  F (36.7  C) (Oral)   Resp 14   Ht 1.608 m (5' 3.3\")   Wt 63 kg (139 lb)   SpO2 100%   BMI 24.39 kg/m     Estimated body mass index is 24.39 kg/m  as calculated from the following:    Height as of this encounter: 1.608 m (5' 3.3\").    Weight as of this encounter: 63 kg (139 lb).    Physical Exam  Vitals reviewed.   Constitutional:       Appearance: Normal appearance.   HENT:      Right Ear: Tympanic membrane normal. There is no impacted cerumen.      Left Ear: Tympanic membrane normal. There is no impacted cerumen.      Mouth/Throat:      Mouth: Mucous membranes are moist.      Pharynx: Oropharynx is clear. No oropharyngeal exudate or posterior oropharyngeal erythema.   Cardiovascular:      Rate and Rhythm: Normal rate and regular rhythm.      Heart sounds: Normal heart sounds. No murmur heard.     No gallop.   Pulmonary:      Effort: Pulmonary effort is normal. No respiratory distress.      Breath sounds: Normal breath sounds. No stridor. No wheezing, rhonchi or rales.   Abdominal:      General: Abdomen is flat. There is no distension.      Palpations: Abdomen is soft. There is no mass.      Tenderness: There is no abdominal tenderness. There is no guarding.      Hernia: No hernia is present.   Musculoskeletal:         General: Normal range of motion.      Cervical back: Normal range of motion and neck supple. No rigidity or tenderness.      Right lower leg: No edema.      Left lower leg: No edema.   Lymphadenopathy:      Cervical: No cervical adenopathy.   Skin:     General: Skin is warm and dry.   Neurological:      General: No focal deficit present.      Mental Status: She is alert.               Signed Electronically by: Andra Thomas MD    "

## 2025-07-03 NOTE — NURSING NOTE
Prior to immunization administration, verified patients identity using patient s name and date of birth. Please see Immunization Activity for additional information.     Screening Questionnaire for Adult Immunization    Are you sick today?   No   Do you have allergies to medications, food, a vaccine component or latex?   No   Have you ever had a serious reaction after receiving a vaccination?   No   Do you have a long-term health problem with heart, lung, kidney, or metabolic disease (e.g., diabetes), asthma, a blood disorder, no spleen, complement component deficiency, a cochlear implant, or a spinal fluid leak?  Are you on long-term aspirin therapy?   No   Do you have cancer, leukemia, HIV/AIDS, or any other immune system problem?   No   Do you have a parent, brother, or sister with an immune system problem?   No   In the past 3 months, have you taken medications that affect  your immune system, such as prednisone, other steroids, or anticancer drugs; drugs for the treatment of rheumatoid arthritis, Crohn s disease, or psoriasis; or have you had radiation treatments?   No   Have you had a seizure, or a brain or other nervous system problem?   No   During the past year, have you received a transfusion of blood or blood    products, or been given immune (gamma) globulin or antiviral drug?   No   For women: Are you pregnant or is there a chance you could become       pregnant during the next month?   No   Have you received any vaccinations in the past 4 weeks?   No     Immunization questionnaire answers were all negative.    Patient is receiving PCV 20.      Patient instructed to remain in clinic for 15 minutes afterwards, and to report any adverse reactions.     Screening performed by Ishmael Walker MA on 7/3/2025 at 9:10 AM.

## 2025-07-07 PROBLEM — G47.00 INSOMNIA, UNSPECIFIED TYPE: Status: ACTIVE | Noted: 2025-07-07

## 2025-07-07 PROBLEM — Z23 NEED FOR VACCINATION: Status: ACTIVE | Noted: 2025-07-07

## 2025-07-31 ENCOUNTER — PATIENT OUTREACH (OUTPATIENT)
Dept: CARE COORDINATION | Facility: CLINIC | Age: 60
End: 2025-07-31
Payer: COMMERCIAL

## 2025-08-25 DIAGNOSIS — I10 PRIMARY HYPERTENSION: ICD-10-CM

## 2025-08-26 RX ORDER — LOSARTAN POTASSIUM 50 MG/1
50 TABLET ORAL DAILY
Qty: 90 TABLET | Refills: 1 | Status: SHIPPED | OUTPATIENT
Start: 2025-08-26